# Patient Record
Sex: FEMALE | Race: WHITE | NOT HISPANIC OR LATINO | Employment: UNEMPLOYED | URBAN - NONMETROPOLITAN AREA
[De-identification: names, ages, dates, MRNs, and addresses within clinical notes are randomized per-mention and may not be internally consistent; named-entity substitution may affect disease eponyms.]

---

## 2017-09-08 ENCOUNTER — TRANSCRIBE ORDERS (OUTPATIENT)
Dept: ADMINISTRATIVE | Facility: HOSPITAL | Age: 42
End: 2017-09-08

## 2017-09-08 DIAGNOSIS — R51.9 HEADACHE, UNSPECIFIED HEADACHE TYPE: Primary | ICD-10-CM

## 2017-09-12 ENCOUNTER — APPOINTMENT (OUTPATIENT)
Dept: CT IMAGING | Facility: HOSPITAL | Age: 42
End: 2017-09-12

## 2017-12-29 ENCOUNTER — HOSPITAL ENCOUNTER (EMERGENCY)
Facility: HOSPITAL | Age: 42
End: 2017-12-29

## 2017-12-30 ENCOUNTER — HOSPITAL ENCOUNTER (EMERGENCY)
Facility: HOSPITAL | Age: 42
Discharge: HOME | End: 2017-12-30
Admitting: EMERGENCY MEDICINE

## 2017-12-30 ENCOUNTER — APPOINTMENT (OUTPATIENT)
Dept: CT IMAGING | Facility: HOSPITAL | Age: 42
End: 2017-12-30

## 2017-12-30 VITALS
OXYGEN SATURATION: 99 % | WEIGHT: 180 LBS | HEIGHT: 62 IN | TEMPERATURE: 97 F | BODY MASS INDEX: 33.13 KG/M2 | RESPIRATION RATE: 18 BRPM | HEART RATE: 86 BPM | DIASTOLIC BLOOD PRESSURE: 69 MMHG | SYSTOLIC BLOOD PRESSURE: 110 MMHG
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
## 2017-12-30 DIAGNOSIS — S16.1XXA STRAIN OF NECK MUSCLE, INITIAL ENCOUNTER: ICD-10-CM

## 2017-12-30 DIAGNOSIS — S00.83XA CONTUSION OF FACE, INITIAL ENCOUNTER: Primary | ICD-10-CM

## 2017-12-30 PROCEDURE — 99283 EMERGENCY DEPT VISIT LOW MDM: CPT

## 2017-12-30 PROCEDURE — 72125 CT NECK SPINE W/O DYE: CPT | Performed by: RADIOLOGY

## 2017-12-30 PROCEDURE — 72125 CT NECK SPINE W/O DYE: CPT

## 2017-12-30 PROCEDURE — 70486 CT MAXILLOFACIAL W/O DYE: CPT

## 2017-12-30 PROCEDURE — 70486 CT MAXILLOFACIAL W/O DYE: CPT | Performed by: RADIOLOGY

## 2017-12-30 RX ORDER — CYCLOBENZAPRINE HCL 10 MG
10 TABLET ORAL 3 TIMES DAILY PRN
Qty: 21 TABLET | Refills: 0 | Status: SHIPPED | OUTPATIENT
Start: 2017-12-30 | End: 2020-02-25

## 2017-12-30 RX ORDER — HYDROCODONE BITARTRATE AND ACETAMINOPHEN 5; 325 MG/1; MG/1
1 TABLET ORAL EVERY 6 HOURS PRN
Qty: 10 TABLET | Refills: 0 | Status: SHIPPED | OUTPATIENT
Start: 2017-12-30 | End: 2020-02-25

## 2018-11-27 ENCOUNTER — OFFICE VISIT (OUTPATIENT)
Dept: UROLOGY | Facility: CLINIC | Age: 43
End: 2018-11-27

## 2018-11-27 VITALS — BODY MASS INDEX: 33.68 KG/M2 | WEIGHT: 183 LBS | HEIGHT: 62 IN

## 2018-11-27 DIAGNOSIS — R31.0 GROSS HEMATURIA: ICD-10-CM

## 2018-11-27 DIAGNOSIS — R31.29 MICROHEMATURIA: Primary | ICD-10-CM

## 2018-11-27 LAB
BILIRUB BLD-MCNC: ABNORMAL MG/DL
CLARITY, POC: CLEAR
COLOR UR: YELLOW
GLUCOSE UR STRIP-MCNC: NEGATIVE MG/DL
KETONES UR QL: ABNORMAL
LEUKOCYTE EST, POC: NEGATIVE
NITRITE UR-MCNC: NEGATIVE MG/ML
PH UR: 5 [PH] (ref 5–8)
PROT UR STRIP-MCNC: ABNORMAL MG/DL
RBC # UR STRIP: ABNORMAL /UL
SP GR UR: 1.03 (ref 1–1.03)
UROBILINOGEN UR QL: NORMAL

## 2018-11-27 PROCEDURE — 99214 OFFICE O/P EST MOD 30 MIN: CPT | Performed by: UROLOGY

## 2018-11-27 RX ORDER — DULOXETIN HYDROCHLORIDE 20 MG/1
20 CAPSULE, DELAYED RELEASE ORAL DAILY
COMMUNITY
End: 2020-02-25

## 2018-11-27 NOTE — PROGRESS NOTES
Chief Complaint:          Chief Complaint   Patient presents with   • Blood in Urine       HPI:   43 y.o. female.  43-year-old white female accompanied by her teenage daughter with occasional gross blood for more than 2 years.  She has it when she wipes.  There is a history of stones.  None since she rode a roller coaster several years ago.  There is no history of genitourinary trauma, no urinary tract infections, but she had lots when she was much younger.  She has frequency, urgency, urge incontinence, and stress incontinence the predominance of stress is much greater than the urge.  She is a  4 para 4.  She's had a  and tubal ligation.  Her bowel movements are normal.  Her urinalysis was positive for blood in the range of 3+.  Her examination was entirely unremarkable.  I'm going to initiate an upper and lower tract investigation    Past Medical History:        Past Medical History:   Diagnosis Date   • Kidney stones          Current Meds:     Current Outpatient Medications   Medication Sig Dispense Refill   • DULoxetine (CYMBALTA) 20 MG capsule Take 20 mg by mouth Daily.     • cyclobenzaprine (FLEXERIL) 10 MG tablet Take 1 tablet by mouth 3 (Three) Times a Day As Needed for Muscle Spasms. 21 tablet 0   • HYDROcodone-acetaminophen (NORCO) 5-325 MG per tablet Take 1 tablet by mouth Every 6 (Six) Hours As Needed for Moderate Pain . 10 tablet 0     No current facility-administered medications for this visit.         Allergies:      Allergies   Allergen Reactions   • Naproxen    • Penicillins         Past Surgical History:     Past Surgical History:   Procedure Laterality Date   •  SECTION     • TUBAL ABDOMINAL LIGATION           Social History:     Social History     Socioeconomic History   • Marital status:      Spouse name: Not on file   • Number of children: Not on file   • Years of education: Not on file   • Highest education level: Not on file   Social Needs   • Financial resource  strain: Not on file   • Food insecurity - worry: Not on file   • Food insecurity - inability: Not on file   • Transportation needs - medical: Not on file   • Transportation needs - non-medical: Not on file   Occupational History   • Not on file   Tobacco Use   • Smoking status: Never Smoker   • Smokeless tobacco: Never Used   Substance and Sexual Activity   • Alcohol use: No   • Drug use: No   • Sexual activity: Not on file   Other Topics Concern   • Not on file   Social History Narrative   • Not on file       Family History:     Family History   Problem Relation Age of Onset   • Breast cancer Paternal Aunt    • Hypertension Mother        Review of Systems:     Review of Systems   Constitutional: Negative.  Negative for activity change, appetite change, chills, diaphoresis, fatigue and unexpected weight change.   HENT: Negative for congestion, dental problem, drooling, ear discharge, ear pain, facial swelling, hearing loss, mouth sores, nosebleeds, postnasal drip, rhinorrhea, sinus pressure, sneezing, sore throat, tinnitus, trouble swallowing and voice change.    Eyes: Negative.  Negative for photophobia, pain, discharge, redness, itching and visual disturbance.   Respiratory: Negative.  Negative for apnea, cough, choking, chest tightness, shortness of breath, wheezing and stridor.    Cardiovascular: Negative.  Negative for chest pain, palpitations and leg swelling.   Gastrointestinal: Negative.  Negative for abdominal distention, abdominal pain, anal bleeding, blood in stool, constipation, diarrhea, nausea, rectal pain and vomiting.   Endocrine: Negative.  Negative for cold intolerance, heat intolerance, polydipsia, polyphagia and polyuria.   Genitourinary: Positive for hematuria.   Musculoskeletal: Negative.  Negative for arthralgias, back pain, gait problem, joint swelling, myalgias, neck pain and neck stiffness.   Skin: Negative.  Negative for color change, pallor, rash and wound.   Allergic/Immunologic:  Negative.  Negative for environmental allergies, food allergies and immunocompromised state.   Neurological: Negative.  Negative for dizziness, tremors, seizures, syncope, facial asymmetry, speech difficulty, weakness, light-headedness, numbness and headaches.   Hematological: Negative.  Negative for adenopathy. Does not bruise/bleed easily.   Psychiatric/Behavioral: Negative for agitation, behavioral problems, confusion, decreased concentration, dysphoric mood, hallucinations, self-injury, sleep disturbance and suicidal ideas. The patient is not nervous/anxious and is not hyperactive.    All other systems reviewed and are negative.      Physical Exam:     Physical Exam   Constitutional: She appears well-developed and well-nourished.   HENT:   Head: Normocephalic and atraumatic.   Right Ear: External ear normal.   Left Ear: External ear normal.   Mouth/Throat: Oropharynx is clear and moist.   Eyes: Conjunctivae are normal. Pupils are equal, round, and reactive to light.   Cardiovascular: Normal rate, regular rhythm, normal heart sounds and intact distal pulses.   Pulmonary/Chest: Effort normal and breath sounds normal.   Abdominal: Soft. Bowel sounds are normal. She exhibits no distension and no mass. There is no tenderness. There is no rebound and no guarding.   Genitourinary: No vaginal discharge found.   Genitourinary Comments: Soft nontender abdomen with no organomegaly, rigidity, guarding or tenderness.  Normal vaginal orifice.  No evidence of prolapse no palpable masses.  No significant perineal body abnormalities and a normal external anus.  Neurologic exam is nonfocal.   Musculoskeletal: Normal range of motion.   Neurological: She is alert. She has normal reflexes.   Skin: Skin is warm and dry.   Psychiatric: She has a normal mood and affect. Her behavior is normal. Judgment and thought content normal.       I have reviewed the following portions of the patient's history: allergies, current medications, past  family history, past medical history, past social history, past surgical history, problem list and ROS and confirm it's accurate.      Procedure:       Assessment/Plan:   Hematuria-patient was diagnosed with hematuria.  We discussed the significance of microscopic hematuria versus gross hematuria.  We discussed the presence or absence of the type of clotting identified including vermiform clots consistent with ureteral bleeding versus just pink tinged urine versus desiree clots.  We discussed the presence of urokinase in the urine which causes the clots to dissolve with time.  We discussed the fact that it takes only a very small amount of blood in the urine to make the urine very red appearing and therefore give one the impression that there is much more blood loss that is really present.  He discussed the use of both an upper and lower tract investigation.  I discussed the fact that an upper tract investigation includes a normal renal ultrasound with a significant risks of missing more subtle lesions.  Progressing to a CT scan without contrast and finally the CT scan with contrast being the gold standard to diagnose the small neoplasms.  We discussed the lower tract investigation consisting of a cystoscopy in many of the cases where the upper tract study is negative.  Also discussed the fact that if there is a contraindication to the use of contrast we would do a noncontrasted study and this also has a chance of missing small lesions.  The specific instance would be cases of diabetes and chronic renal insufficiency.  Discussed the fact that there is about a 96% chance of a negative workup with episodes of microscopic hematuria and with much greater in the face of gross hematuria.  We discussed the fact that this is a non-cumulative test.  In other words if there is hematuria next year I would recommend continuing to work up the condition because of the fact that neoplasms may be small at the first workup and easily  are missed.  I discussed the differential diagnosis of hematuria including trauma, neoplasia, infection, etc.  We discussed the fact that if there is any history of chronic kidney disease or risk factors such as diabetes for contrast a noncontrasted study will be utilized.  We will initiate an investigation.     Patient's Body mass index is 33.47 kg/m². BMI is above normal parameters. Recommendations include: educational material.          This document has been electronically signed by JUAN CARLOS RIDER MD November 27, 2018 3:13 PM

## 2018-11-28 PROBLEM — R31.0 GROSS HEMATURIA: Status: ACTIVE | Noted: 2018-11-28

## 2018-12-10 ENCOUNTER — APPOINTMENT (OUTPATIENT)
Dept: CT IMAGING | Facility: HOSPITAL | Age: 43
End: 2018-12-10
Attending: UROLOGY

## 2018-12-13 ENCOUNTER — HOSPITAL ENCOUNTER (OUTPATIENT)
Dept: CT IMAGING | Facility: HOSPITAL | Age: 43
Discharge: HOME | End: 2018-12-13
Attending: UROLOGY | Admitting: UROLOGY

## 2018-12-13 DIAGNOSIS — R31.29 MICROHEMATURIA: ICD-10-CM

## 2018-12-13 PROCEDURE — 25010000002 IOPAMIDOL 61 % SOLUTION: Performed by: UROLOGY

## 2018-12-13 PROCEDURE — 74178 CT ABD&PLV WO CNTR FLWD CNTR: CPT | Performed by: RADIOLOGY

## 2018-12-13 PROCEDURE — 74178 CT ABD&PLV WO CNTR FLWD CNTR: CPT

## 2018-12-13 RX ADMIN — IOPAMIDOL 100 ML: 612 INJECTION, SOLUTION INTRAVENOUS at 14:01

## 2018-12-17 ENCOUNTER — PROCEDURE VISIT (OUTPATIENT)
Dept: UROLOGY | Facility: CLINIC | Age: 43
End: 2018-12-17

## 2018-12-17 VITALS — BODY MASS INDEX: 33.68 KG/M2 | HEIGHT: 62 IN | WEIGHT: 183 LBS

## 2018-12-17 DIAGNOSIS — N34.2 INFECTIVE URETHRITIS: ICD-10-CM

## 2018-12-17 DIAGNOSIS — Z48.816 AFTERCARE FOLLOWING SURGERY OF THE GENITOURINARY SYSTEM: Primary | ICD-10-CM

## 2018-12-17 PROCEDURE — 52000 CYSTOURETHROSCOPY: CPT | Performed by: UROLOGY

## 2018-12-17 PROCEDURE — 96372 THER/PROPH/DIAG INJ SC/IM: CPT | Performed by: UROLOGY

## 2018-12-17 PROCEDURE — 99213 OFFICE O/P EST LOW 20 MIN: CPT | Performed by: UROLOGY

## 2018-12-17 RX ORDER — NITROFURANTOIN 25; 75 MG/1; MG/1
100 CAPSULE ORAL DAILY
Qty: 60 CAPSULE | Refills: 3 | Status: SHIPPED | OUTPATIENT
Start: 2018-12-17 | End: 2020-02-25

## 2018-12-17 RX ORDER — GENTAMICIN SULFATE 40 MG/ML
80 INJECTION, SOLUTION INTRAMUSCULAR; INTRAVENOUS ONCE
Status: COMPLETED | OUTPATIENT
Start: 2018-12-17 | End: 2018-12-17

## 2018-12-17 RX ADMIN — GENTAMICIN SULFATE 80 MG: 40 INJECTION, SOLUTION INTRAMUSCULAR; INTRAVENOUS at 08:22

## 2018-12-17 NOTE — PROGRESS NOTES
Chief Complaint:          Chief Complaint   Patient presents with   • Blood in Urine     Microscopic Hematuria       HPI:   43 y.o. female.  43-year-old white female accompanied by her teenage daughter with occasional gross blood for more than 2 years.  She has it when she wipes.  There is a history of stones.  None since she rode a roller coaster several years ago.  There is no history of genitourinary trauma, no urinary tract infections, but she had lots when she was much younger.  She has frequency, urgency, urge incontinence, and stress incontinence the predominance of stress is much greater than the urge.  She is a  4 para 4.  She's had a  and tubal ligation.  Her bowel movements are normal.  Her urinalysis was positive for blood in the range of 3+.  Her examination was entirely unremarkable.  I'm going to initiate an upper and lower tract investigation.  She returns today.  Her CT with and without contrast was negative.  Her cystoscopy showed extensive inflammatory polyps in a place her on prophylactic Macrodantin and see her back in 8 weeks    Past Medical History:        Past Medical History:   Diagnosis Date   • Kidney stones          Current Meds:     Current Outpatient Medications   Medication Sig Dispense Refill   • cyclobenzaprine (FLEXERIL) 10 MG tablet Take 1 tablet by mouth 3 (Three) Times a Day As Needed for Muscle Spasms. 21 tablet 0   • DULoxetine (CYMBALTA) 20 MG capsule Take 20 mg by mouth Daily.     • HYDROcodone-acetaminophen (NORCO) 5-325 MG per tablet Take 1 tablet by mouth Every 6 (Six) Hours As Needed for Moderate Pain . 10 tablet 0     No current facility-administered medications for this visit.         Allergies:      Allergies   Allergen Reactions   • Naproxen    • Penicillins         Past Surgical History:     Past Surgical History:   Procedure Laterality Date   •  SECTION     • TUBAL ABDOMINAL LIGATION           Social History:     Social History      Socioeconomic History   • Marital status:      Spouse name: Not on file   • Number of children: Not on file   • Years of education: Not on file   • Highest education level: Not on file   Social Needs   • Financial resource strain: Not on file   • Food insecurity - worry: Not on file   • Food insecurity - inability: Not on file   • Transportation needs - medical: Not on file   • Transportation needs - non-medical: Not on file   Occupational History   • Not on file   Tobacco Use   • Smoking status: Never Smoker   • Smokeless tobacco: Never Used   Substance and Sexual Activity   • Alcohol use: No   • Drug use: No   • Sexual activity: Not on file   Other Topics Concern   • Not on file   Social History Narrative   • Not on file       Family History:     Family History   Problem Relation Age of Onset   • Breast cancer Paternal Aunt    • Hypertension Mother        Review of Systems:     Review of Systems   Constitutional: Negative.  Negative for activity change, appetite change, chills, diaphoresis, fatigue and unexpected weight change.   HENT: Negative for congestion, dental problem, drooling, ear discharge, ear pain, facial swelling, hearing loss, mouth sores, nosebleeds, postnasal drip, rhinorrhea, sinus pressure, sneezing, sore throat, tinnitus, trouble swallowing and voice change.    Eyes: Negative.  Negative for photophobia, pain, discharge, redness, itching and visual disturbance.   Respiratory: Negative.  Negative for apnea, cough, choking, chest tightness, shortness of breath, wheezing and stridor.    Cardiovascular: Negative.  Negative for chest pain, palpitations and leg swelling.   Gastrointestinal: Negative.  Negative for abdominal distention, abdominal pain, anal bleeding, blood in stool, constipation, diarrhea, nausea, rectal pain and vomiting.   Endocrine: Negative.  Negative for cold intolerance, heat intolerance, polydipsia, polyphagia and polyuria.   Musculoskeletal: Negative.  Negative for  arthralgias, back pain, gait problem, joint swelling, myalgias, neck pain and neck stiffness.   Skin: Negative.  Negative for color change, pallor, rash and wound.   Allergic/Immunologic: Negative.  Negative for environmental allergies, food allergies and immunocompromised state.   Neurological: Negative.  Negative for dizziness, tremors, seizures, syncope, facial asymmetry, speech difficulty, weakness, light-headedness, numbness and headaches.   Hematological: Negative.  Negative for adenopathy. Does not bruise/bleed easily.   Psychiatric/Behavioral: Negative for agitation, behavioral problems, confusion, decreased concentration, dysphoric mood, hallucinations, self-injury, sleep disturbance and suicidal ideas. The patient is not nervous/anxious and is not hyperactive.    All other systems reviewed and are negative.      Physical Exam:     Physical Exam   Constitutional: She appears well-developed and well-nourished.   HENT:   Head: Normocephalic and atraumatic.   Right Ear: External ear normal.   Left Ear: External ear normal.   Mouth/Throat: Oropharynx is clear and moist.   Eyes: Conjunctivae are normal. Pupils are equal, round, and reactive to light.   Cardiovascular: Normal rate, regular rhythm, normal heart sounds and intact distal pulses.   Pulmonary/Chest: Effort normal and breath sounds normal.   Abdominal: Soft. Bowel sounds are normal. She exhibits no distension and no mass. There is no tenderness. There is no rebound and no guarding.   Genitourinary: Vagina normal. No vaginal discharge found.   Musculoskeletal: Normal range of motion.   Neurological: She is alert. She has normal reflexes.   Skin: Skin is warm and dry.   Psychiatric: She has a normal mood and affect. Her behavior is normal. Judgment and thought content normal.       I have reviewed the following portions of the patient's history: allergies, current medications, past family history, past medical history, past social history, past surgical  history, problem list and ROS and confirm it's accurate.      Procedure:   Cystoscopy:  Patient presents today for cystourethroscopy.  I went ahead and obtained an informed consent including the risk of anesthesia, bleeding, infection, etc.  After prep and drape in a sterile fashion in the low dorsal lithotomy position the urethra was gently anesthetized with 10 cc of 2% viscous Xylocaine jelly.  After an appropriate period of topical anesthesia I used the Olympus digital 14 [place] flexible cystoscope to examine the anterior urethra which was completely normal, the ureteral orifices were visualized and normal in position and configuration there were no stones, tumors or foreign bodies.  Were extensive inflammatory polyps at the bladder neck The blue light was enabled and was negative allowing us to see small mucosal lesions. The patient was given 80 mg of gentamicin in an intramuscular fashion  as prophylaxis for the cystoscopy and released from the clinic.    Assessment/Plan:   Chronic cystitis-as evidenced by inflammatory polyps at the bladder neck I recommended prophylactic Macrodantin and a recheck in 8 weeks     Patient's Body mass index is 33.47 kg/m². BMI is above normal parameters. Recommendations include: educational material.          This document has been electronically signed by JUAN CARLOS RIDER MD December 17, 2018 8:04 AM

## 2018-12-20 PROBLEM — N34.2 INFECTIVE URETHRITIS: Status: ACTIVE | Noted: 2018-12-20

## 2019-02-11 ENCOUNTER — OFFICE VISIT (OUTPATIENT)
Dept: UROLOGY | Facility: CLINIC | Age: 44
End: 2019-02-11

## 2019-02-11 VITALS — HEIGHT: 62 IN | WEIGHT: 183 LBS | BODY MASS INDEX: 33.68 KG/M2

## 2019-02-11 DIAGNOSIS — R31.9 HEMATURIA, UNSPECIFIED TYPE: Primary | ICD-10-CM

## 2019-02-11 LAB
BILIRUB BLD-MCNC: NEGATIVE MG/DL
CLARITY, POC: CLEAR
COLOR UR: YELLOW
GLUCOSE UR STRIP-MCNC: NEGATIVE MG/DL
KETONES UR QL: NEGATIVE
LEUKOCYTE EST, POC: NEGATIVE
NITRITE UR-MCNC: NEGATIVE MG/ML
PH UR: 5.5 [PH] (ref 5–8)
PROT UR STRIP-MCNC: ABNORMAL MG/DL
RBC # UR STRIP: ABNORMAL /UL
SP GR UR: 1.03 (ref 1–1.03)
UROBILINOGEN UR QL: NORMAL

## 2019-02-11 PROCEDURE — 99213 OFFICE O/P EST LOW 20 MIN: CPT | Performed by: UROLOGY

## 2019-02-11 NOTE — PROGRESS NOTES
Chief Complaint:          Chief Complaint   Patient presents with   • F/u Hematuria       HPI:   43 y.o. female.  43-year-old white female accompanied by her teenage daughter with occasional gross blood for more than 2 years.  She has it when she wipes.  There is a history of stones.  None since she rode a roller coaster several years ago.  There is no history of genitourinary trauma, no urinary tract infections, but she had lots when she was much younger.  She has frequency, urgency, urge incontinence, and stress incontinence the predominance of stress is much greater than the urge.  She is a  4 para 4.  She's had a  and tubal ligation.  Her bowel movements are normal.  Her urinalysis was positive for blood in the range of 3+.  Her examination was entirely unremarkable.  I'm going to initiate an upper and lower tract investigation.  She returns today.  Her CT with and without contrast was negative.  Her cystoscopy showed extensive inflammatory polyps in a place her on prophylactic Macrodantin and see her back in 8 weeks  She returns today she's doing great she is completely asymptomatic I'll see her back in 1 year        Past Medical History:        Past Medical History:   Diagnosis Date   • Kidney stones          Current Meds:     Current Outpatient Medications   Medication Sig Dispense Refill   • cyclobenzaprine (FLEXERIL) 10 MG tablet Take 1 tablet by mouth 3 (Three) Times a Day As Needed for Muscle Spasms. 21 tablet 0   • DULoxetine (CYMBALTA) 20 MG capsule Take 20 mg by mouth Daily.     • HYDROcodone-acetaminophen (NORCO) 5-325 MG per tablet Take 1 tablet by mouth Every 6 (Six) Hours As Needed for Moderate Pain . 10 tablet 0   • nitrofurantoin, macrocrystal-monohydrate, (MACROBID) 100 MG capsule Take 1 capsule by mouth Daily. 60 capsule 3     No current facility-administered medications for this visit.         Allergies:      Allergies   Allergen Reactions   • Naproxen    • Penicillins          Past Surgical History:     Past Surgical History:   Procedure Laterality Date   •  SECTION     • TUBAL ABDOMINAL LIGATION           Social History:     Social History     Socioeconomic History   • Marital status:      Spouse name: Not on file   • Number of children: Not on file   • Years of education: Not on file   • Highest education level: Not on file   Social Needs   • Financial resource strain: Not on file   • Food insecurity - worry: Not on file   • Food insecurity - inability: Not on file   • Transportation needs - medical: Not on file   • Transportation needs - non-medical: Not on file   Occupational History   • Not on file   Tobacco Use   • Smoking status: Never Smoker   • Smokeless tobacco: Never Used   Substance and Sexual Activity   • Alcohol use: No   • Drug use: No   • Sexual activity: Yes     Partners: Male     Birth control/protection: None   Other Topics Concern   • Not on file   Social History Narrative   • Not on file       Family History:     Family History   Problem Relation Age of Onset   • Breast cancer Paternal Aunt    • Hypertension Mother        Review of Systems:     Review of Systems   Constitutional: Negative.  Negative for activity change, appetite change, chills, diaphoresis, fatigue and unexpected weight change.   HENT: Negative for congestion, dental problem, drooling, ear discharge, ear pain, facial swelling, hearing loss, mouth sores, nosebleeds, postnasal drip, rhinorrhea, sinus pressure, sneezing, sore throat, tinnitus, trouble swallowing and voice change.    Eyes: Negative.  Negative for photophobia, pain, discharge, redness, itching and visual disturbance.   Respiratory: Negative.  Negative for apnea, cough, choking, chest tightness, shortness of breath, wheezing and stridor.    Cardiovascular: Negative.  Negative for chest pain, palpitations and leg swelling.   Gastrointestinal: Negative.  Negative for abdominal distention, abdominal pain, anal bleeding, blood  in stool, constipation, diarrhea, nausea, rectal pain and vomiting.   Endocrine: Negative.  Negative for cold intolerance, heat intolerance, polydipsia, polyphagia and polyuria.   Musculoskeletal: Negative.  Negative for arthralgias, back pain, gait problem, joint swelling, myalgias, neck pain and neck stiffness.   Skin: Negative.  Negative for color change, pallor, rash and wound.   Allergic/Immunologic: Negative.  Negative for environmental allergies, food allergies and immunocompromised state.   Neurological: Negative.  Negative for dizziness, tremors, seizures, syncope, facial asymmetry, speech difficulty, weakness, light-headedness, numbness and headaches.   Hematological: Negative.  Negative for adenopathy. Does not bruise/bleed easily.   Psychiatric/Behavioral: Negative for agitation, behavioral problems, confusion, decreased concentration, dysphoric mood, hallucinations, self-injury, sleep disturbance and suicidal ideas. The patient is not nervous/anxious and is not hyperactive.    All other systems reviewed and are negative.      Physical Exam:     Physical Exam   Constitutional: She appears well-developed and well-nourished.   HENT:   Head: Normocephalic and atraumatic.   Right Ear: External ear normal.   Left Ear: External ear normal.   Mouth/Throat: Oropharynx is clear and moist.   Eyes: Conjunctivae are normal. Pupils are equal, round, and reactive to light.   Cardiovascular: Normal rate, regular rhythm, normal heart sounds and intact distal pulses.   Pulmonary/Chest: Effort normal and breath sounds normal.   Abdominal: Soft. Bowel sounds are normal. She exhibits no distension and no mass. There is no tenderness. There is no rebound and no guarding.   Genitourinary: No vaginal discharge found.   Musculoskeletal: Normal range of motion.   Neurological: She is alert. She has normal reflexes.   Skin: Skin is warm and dry.   Psychiatric: She has a normal mood and affect. Her behavior is normal. Judgment and  thought content normal.       I have reviewed the following portions of the patient's history: allergies, current medications, past family history, past medical history, past social history, past surgical history, problem list and ROS and confirm it's accurate.      Procedure:       Assessment/Plan:   Hematuria-patient was diagnosed with hematuria.  We discussed the significance of microscopic hematuria versus gross hematuria.  We discussed the presence or absence of the type of clotting identified including vermiform clots consistent with ureteral bleeding versus just pink tinged urine versus desiree clots.  We discussed the presence of urokinase in the urine which causes the clots to dissolve with time.  We discussed the fact that it takes only a very small amount of blood in the urine to make the urine very red appearing and therefore give one the impression that there is much more blood loss that is really present.  He discussed the use of both an upper and lower tract investigation.  I discussed the fact that an upper tract investigation includes a normal renal ultrasound with a significant risks of missing more subtle lesions.  Progressing to a CT scan without contrast and finally the CT scan with contrast being the gold standard to diagnose the small neoplasms.  We discussed the lower tract investigation consisting of a cystoscopy in many of the cases where the upper tract study is negative.  Also discussed the fact that if there is a contraindication to the use of contrast we would do a noncontrasted study and this also has a chance of missing small lesions.  The specific instance would be cases of diabetes and chronic renal insufficiency.  Discussed the fact that there is about a 96% chance of a negative workup with episodes of microscopic hematuria and with much greater in the face of gross hematuria.  We discussed the fact that this is a non-cumulative test.  In other words if there is hematuria next year I  would recommend continuing to work up the condition because of the fact that neoplasms may be small at the first workup and easily are missed.  I discussed the differential diagnosis of hematuria including trauma, neoplasia, infection, etc.  We discussed the fact that if there is any history of chronic kidney disease or risk factors such as diabetes for contrast a noncontrasted study will be utilized.  We will initiate an investigation.  Is had a completely negative workup of her symptoms are now completely resolved     Patient's Body mass index is 33.47 kg/m². BMI is above normal parameters. Recommendations include: educational material.          This document has been electronically signed by JUAN CARLOS RIDER MD February 11, 2019 9:16 AM

## 2020-02-25 ENCOUNTER — OFFICE VISIT (OUTPATIENT)
Dept: UROLOGY | Facility: CLINIC | Age: 45
End: 2020-02-25

## 2020-02-25 VITALS — HEIGHT: 63 IN | BODY MASS INDEX: 35.26 KG/M2 | WEIGHT: 199 LBS

## 2020-02-25 DIAGNOSIS — R31.0 GROSS HEMATURIA: ICD-10-CM

## 2020-02-25 DIAGNOSIS — R31.9 HEMATURIA, UNSPECIFIED TYPE: Primary | ICD-10-CM

## 2020-02-25 LAB
BILIRUB BLD-MCNC: NEGATIVE MG/DL
CLARITY, POC: CLEAR
COLOR UR: YELLOW
GLUCOSE UR STRIP-MCNC: NEGATIVE MG/DL
KETONES UR QL: NEGATIVE
LEUKOCYTE EST, POC: NEGATIVE
NITRITE UR-MCNC: NEGATIVE MG/ML
PH UR: 5.5 [PH] (ref 5–8)
PROT UR STRIP-MCNC: NEGATIVE MG/DL
RBC # UR STRIP: ABNORMAL /UL
SP GR UR: 1.02 (ref 1–1.03)
UROBILINOGEN UR QL: NORMAL

## 2020-02-25 PROCEDURE — 99213 OFFICE O/P EST LOW 20 MIN: CPT | Performed by: UROLOGY

## 2020-02-25 RX ORDER — ALBUTEROL SULFATE 90 UG/1
AEROSOL, METERED RESPIRATORY (INHALATION)
COMMUNITY
Start: 2020-01-13 | End: 2020-08-25

## 2020-02-25 RX ORDER — IBUPROFEN 800 MG/1
TABLET ORAL
COMMUNITY
Start: 2020-01-13 | End: 2020-08-25

## 2020-02-25 NOTE — PROGRESS NOTES
Chief Complaint:          Chief Complaint   Patient presents with   • Blood in Urine     1 yr f/u       HPI:   44 y.o. female with hematuria.  She is had an extensive previous work-up.  She has a history of stones.  Her CT with and without contrast was negative her cystoscopy showed inflammatory polyps she returns today she is absolutely asymptomatic her urine was positive we discussed the chronicity of hematuria I am going to recommend close observation and follow-up based on this.      Past Medical History:        Past Medical History:   Diagnosis Date   • Kidney stones          Current Meds:     Current Outpatient Medications   Medication Sig Dispense Refill   • albuterol sulfate  (90 Base) MCG/ACT inhaler      • ibuprofen (ADVIL,MOTRIN) 800 MG tablet        No current facility-administered medications for this visit.         Allergies:      Allergies   Allergen Reactions   • Naproxen Rash   • Penicillins Rash        Past Surgical History:     Past Surgical History:   Procedure Laterality Date   •  SECTION     • TUBAL ABDOMINAL LIGATION           Social History:     Social History     Socioeconomic History   • Marital status:      Spouse name: Not on file   • Number of children: Not on file   • Years of education: Not on file   • Highest education level: Not on file   Tobacco Use   • Smoking status: Never Smoker   • Smokeless tobacco: Never Used   Substance and Sexual Activity   • Alcohol use: No   • Drug use: No   • Sexual activity: Yes     Partners: Male     Birth control/protection: None       Family History:     Family History   Problem Relation Age of Onset   • Breast cancer Paternal Aunt    • Hypertension Mother        Review of Systems:     Review of Systems   Constitutional: Negative.  Negative for activity change, appetite change, chills, diaphoresis, fatigue and unexpected weight change.   HENT: Negative for congestion, dental problem, drooling, ear discharge, ear pain, facial  swelling, hearing loss, mouth sores, nosebleeds, postnasal drip, rhinorrhea, sinus pressure, sneezing, sore throat, tinnitus, trouble swallowing and voice change.    Eyes: Negative.  Negative for photophobia, pain, discharge, redness, itching and visual disturbance.   Respiratory: Negative.  Negative for apnea, cough, choking, chest tightness, shortness of breath, wheezing and stridor.    Cardiovascular: Negative.  Negative for chest pain, palpitations and leg swelling.   Gastrointestinal: Negative.  Negative for abdominal distention, abdominal pain, anal bleeding, blood in stool, constipation, diarrhea, nausea, rectal pain and vomiting.   Endocrine: Negative.  Negative for cold intolerance, heat intolerance, polydipsia, polyphagia and polyuria.   Genitourinary: Positive for hematuria.   Musculoskeletal: Negative.  Negative for arthralgias, back pain, gait problem, joint swelling, myalgias, neck pain and neck stiffness.   Skin: Negative.  Negative for color change, pallor, rash and wound.   Allergic/Immunologic: Negative.  Negative for environmental allergies, food allergies and immunocompromised state.   Neurological: Negative.  Negative for dizziness, tremors, seizures, syncope, facial asymmetry, speech difficulty, weakness, light-headedness, numbness and headaches.   Hematological: Negative.  Negative for adenopathy. Does not bruise/bleed easily.   Psychiatric/Behavioral: Negative for agitation, behavioral problems, confusion, decreased concentration, dysphoric mood, hallucinations, self-injury, sleep disturbance and suicidal ideas. The patient is not nervous/anxious and is not hyperactive.    All other systems reviewed and are negative.      Physical Exam:     Physical Exam   Constitutional: She appears well-developed and well-nourished.   HENT:   Head: Normocephalic and atraumatic.   Right Ear: External ear normal.   Left Ear: External ear normal.   Mouth/Throat: Oropharynx is clear and moist.   Eyes: Pupils  are equal, round, and reactive to light. Conjunctivae are normal.   Cardiovascular: Normal rate, regular rhythm, normal heart sounds and intact distal pulses.   Pulmonary/Chest: Effort normal and breath sounds normal.   Abdominal: Soft. Bowel sounds are normal. She exhibits no distension and no mass. There is no tenderness. There is no rebound and no guarding.   Genitourinary: No vaginal discharge found.   Musculoskeletal: Normal range of motion.   Neurological: She is alert. She has normal reflexes.   Skin: Skin is warm and dry.   Psychiatric: She has a normal mood and affect. Her behavior is normal. Judgment and thought content normal.       I have reviewed the following portions of the patient's history: allergies, current medications, past family history, past medical history, past social history, past surgical history, problem list and ROS and confirm it's accurate.    Procedure:       Assessment/Plan:   Hematuria-patient was diagnosed with hematuria.  We discussed the significance of microscopic hematuria versus gross hematuria.  We discussed the presence or absence of the type of clotting identified including vermiform clots consistent with ureteral bleeding versus just pink tinged urine versus desiree clots.  We discussed the presence of urokinase in the urine which causes the clots to dissolve with time.  We discussed the fact that it takes only a very small amount of blood in the urine to make the urine very red appearing and therefore give one the impression that there is much more blood loss that is really present.  He discussed the use of both an upper and lower tract investigation.  I discussed the fact that an upper tract investigation includes a normal renal ultrasound with a significant risks of missing more subtle lesions.  Progressing to a CT scan without contrast and finally the CT scan with contrast being the gold standard to diagnose the small neoplasms.  We discussed the lower tract  investigation consisting of a cystoscopy in many of the cases where the upper tract study is negative.  Also discussed the fact that if there is a contraindication to the use of contrast we would do a noncontrasted study and this also has a chance of missing small lesions.  The specific instance would be cases of diabetes and chronic renal insufficiency.  Discussed the fact that there is about a 96% chance of a negative workup with episodes of microscopic hematuria and with much greater in the face of gross hematuria.  We discussed the fact that this is a non-cumulative test.  In other words if there is hematuria next year I would recommend continuing to work up the condition because of the fact that neoplasms may be small at the first workup and easily are missed.  I discussed the differential diagnosis of hematuria including trauma, neoplasia, infection, etc.  We discussed the fact that if there is any history of chronic kidney disease or risk factors such as diabetes for contrast a noncontrasted study will be utilized.  We will initiate an investigation.  Status post an upper and lower tract investigation we will watch her yearly            Patient's Body mass index is 35.25 kg/m². BMI is above normal parameters. Recommendations include: educational material.              This document has been electronically signed by JUAN CARLOS RIDER MD February 25, 2020 3:15 PM

## 2020-06-26 ENCOUNTER — OFFICE VISIT (OUTPATIENT)
Dept: UROLOGY | Facility: CLINIC | Age: 45
End: 2020-06-26

## 2020-06-26 ENCOUNTER — HOSPITAL ENCOUNTER (OUTPATIENT)
Dept: GENERAL RADIOLOGY | Facility: HOSPITAL | Age: 45
Discharge: HOME | End: 2020-06-26
Admitting: UROLOGY

## 2020-06-26 VITALS — HEIGHT: 63 IN | TEMPERATURE: 96.5 F | WEIGHT: 199.08 LBS | BODY MASS INDEX: 35.27 KG/M2

## 2020-06-26 DIAGNOSIS — N20.0 RENAL CALCULUS: ICD-10-CM

## 2020-06-26 DIAGNOSIS — R31.9 HEMATURIA, UNSPECIFIED TYPE: Primary | ICD-10-CM

## 2020-06-26 DIAGNOSIS — R31.9 HEMATURIA, UNSPECIFIED TYPE: ICD-10-CM

## 2020-06-26 PROCEDURE — 99213 OFFICE O/P EST LOW 20 MIN: CPT | Performed by: UROLOGY

## 2020-06-26 PROCEDURE — 74018 RADEX ABDOMEN 1 VIEW: CPT | Performed by: RADIOLOGY

## 2020-06-26 PROCEDURE — 74018 RADEX ABDOMEN 1 VIEW: CPT

## 2020-06-26 NOTE — PROGRESS NOTES
Chief Complaint:          Chief Complaint   Patient presents with   • Flank Pain     kub prior       HPI:   44 y.o. female returns today status post a negative cystoscopy.  She has a possible kidney stone.  KUB was plus or minus she had a large amount of stool no gross hematuria, need for 2 months if it persists some negative CT scan      Past Medical History:        Past Medical History:   Diagnosis Date   • Kidney stones          Current Meds:     Current Outpatient Medications   Medication Sig Dispense Refill   • albuterol sulfate  (90 Base) MCG/ACT inhaler      • ibuprofen (ADVIL,MOTRIN) 800 MG tablet        No current facility-administered medications for this visit.         Allergies:      Allergies   Allergen Reactions   • Naproxen Rash   • Penicillins Rash        Past Surgical History:     Past Surgical History:   Procedure Laterality Date   •  SECTION     • TUBAL ABDOMINAL LIGATION           Social History:     Social History     Socioeconomic History   • Marital status:      Spouse name: Not on file   • Number of children: Not on file   • Years of education: Not on file   • Highest education level: Not on file   Tobacco Use   • Smoking status: Never Smoker   • Smokeless tobacco: Never Used   Substance and Sexual Activity   • Alcohol use: No   • Drug use: No   • Sexual activity: Yes     Partners: Male     Birth control/protection: None       Family History:     Family History   Problem Relation Age of Onset   • Breast cancer Paternal Aunt    • Hypertension Mother        Review of Systems:     Review of Systems   Constitutional: Negative.  Negative for activity change, appetite change, chills, diaphoresis, fatigue and unexpected weight change.   HENT: Negative for congestion, dental problem, drooling, ear discharge, ear pain, facial swelling, hearing loss, mouth sores, nosebleeds, postnasal drip, rhinorrhea, sinus pressure, sneezing, sore throat, tinnitus, trouble swallowing and voice  change.    Eyes: Negative.  Negative for photophobia, pain, discharge, redness, itching and visual disturbance.   Respiratory: Negative.  Negative for apnea, cough, choking, chest tightness, shortness of breath, wheezing and stridor.    Cardiovascular: Negative.  Negative for chest pain, palpitations and leg swelling.   Gastrointestinal: Negative.  Negative for abdominal distention, abdominal pain, anal bleeding, blood in stool, constipation, diarrhea, nausea, rectal pain and vomiting.   Endocrine: Negative.  Negative for cold intolerance, heat intolerance, polydipsia, polyphagia and polyuria.   Genitourinary: Positive for flank pain.   Musculoskeletal: Negative for arthralgias, back pain, gait problem, joint swelling, myalgias, neck pain and neck stiffness.   Skin: Negative.  Negative for color change, pallor, rash and wound.   Allergic/Immunologic: Negative.  Negative for environmental allergies, food allergies and immunocompromised state.   Neurological: Negative.  Negative for dizziness, tremors, seizures, syncope, facial asymmetry, speech difficulty, weakness, light-headedness, numbness and headaches.   Hematological: Negative.  Negative for adenopathy. Does not bruise/bleed easily.   Psychiatric/Behavioral: Negative for agitation, behavioral problems, confusion, decreased concentration, dysphoric mood, hallucinations, self-injury, sleep disturbance and suicidal ideas. The patient is not nervous/anxious and is not hyperactive.    All other systems reviewed and are negative.      Physical Exam:     Physical Exam   Constitutional: She appears well-developed and well-nourished.   HENT:   Head: Normocephalic and atraumatic.   Right Ear: External ear normal.   Left Ear: External ear normal.   Mouth/Throat: Oropharynx is clear and moist.   Eyes: Pupils are equal, round, and reactive to light. Conjunctivae are normal.   Cardiovascular: Normal rate, regular rhythm, normal heart sounds and intact distal pulses.      Pulmonary/Chest: Effort normal and breath sounds normal.   Abdominal: Soft. Bowel sounds are normal. She exhibits no distension and no mass. There is no tenderness. There is no rebound and no guarding.   Genitourinary: No vaginal discharge found.   Musculoskeletal: Normal range of motion.   Neurological: She is alert. She has normal reflexes.   Skin: Skin is warm and dry.   Psychiatric: She has a normal mood and affect. Her behavior is normal. Judgment and thought content normal.       I have reviewed the following portions of the patient's history: allergies, current medications, past family history, past medical history, past social history, past surgical history, problem list and ROS and confirm it's accurate.      Procedure:       Assessment/Plan:   Renal stone-KUB is noncontributory and I watch her for 2 months as she is now pain-free if she persists we will get a CT scan            Patient's Body mass index is 35.27 kg/m². BMI is above normal parameters. Recommendations include: educational material.              This document has been electronically signed by JUAN CARLOS RIDER MD June 26, 2020 12:40

## 2020-06-29 PROBLEM — N20.0 RENAL CALCULUS: Status: ACTIVE | Noted: 2020-06-29

## 2020-08-25 ENCOUNTER — OFFICE VISIT (OUTPATIENT)
Dept: UROLOGY | Facility: CLINIC | Age: 45
End: 2020-08-25

## 2020-08-25 VITALS — WEIGHT: 203 LBS | HEIGHT: 63 IN | BODY MASS INDEX: 35.97 KG/M2 | TEMPERATURE: 98.3 F

## 2020-08-25 DIAGNOSIS — N20.0 RENAL CALCULUS: Primary | ICD-10-CM

## 2020-08-25 DIAGNOSIS — G89.29 CHRONIC LEFT-SIDED LOW BACK PAIN WITH SCIATICA, SCIATICA LATERALITY UNSPECIFIED: ICD-10-CM

## 2020-08-25 DIAGNOSIS — M54.40 CHRONIC LEFT-SIDED LOW BACK PAIN WITH SCIATICA, SCIATICA LATERALITY UNSPECIFIED: ICD-10-CM

## 2020-08-25 PROCEDURE — 99213 OFFICE O/P EST LOW 20 MIN: CPT | Performed by: UROLOGY

## 2020-08-25 NOTE — PROGRESS NOTES
Chief Complaint:          Chief Complaint   Patient presents with   • Flank Pain     2 month fu        HPI:   45 y.o. female has a history of a possible kidney stone KUB was not remarkable.  The pain persists I am to get a CT scan to evaluate she has pain when she sneezes it radiates down her right hip she has nausea she has vomiting she has reflux symptomatology.  I am to get the stone CT as soon as possible and follow-up with her based on this.      Past Medical History:        Past Medical History:   Diagnosis Date   • Kidney stones          Current Meds:     No current outpatient medications on file.     No current facility-administered medications for this visit.         Allergies:      Allergies   Allergen Reactions   • Naproxen Rash   • Penicillins Rash        Past Surgical History:     Past Surgical History:   Procedure Laterality Date   •  SECTION     • TUBAL ABDOMINAL LIGATION           Social History:     Social History     Socioeconomic History   • Marital status:      Spouse name: Not on file   • Number of children: Not on file   • Years of education: Not on file   • Highest education level: Not on file   Tobacco Use   • Smoking status: Never Smoker   • Smokeless tobacco: Never Used   Substance and Sexual Activity   • Alcohol use: No   • Drug use: No   • Sexual activity: Yes     Partners: Male     Birth control/protection: None       Family History:     Family History   Problem Relation Age of Onset   • Breast cancer Paternal Aunt    • Hypertension Mother        Review of Systems:     Review of Systems   Constitutional: Negative.  Negative for activity change, appetite change, chills, diaphoresis, fatigue and unexpected weight change.   HENT: Negative for congestion, dental problem, drooling, ear discharge, ear pain, facial swelling, hearing loss, mouth sores, nosebleeds, postnasal drip, rhinorrhea, sinus pressure, sneezing, sore throat, tinnitus, trouble swallowing and voice change.       Eyes: Negative.  Negative for photophobia, pain, discharge, redness, itching and visual disturbance.   Respiratory: Negative.  Negative for apnea, cough, choking, chest tightness, shortness of breath, wheezing and stridor.    Cardiovascular: Negative.  Negative for chest pain, palpitations and leg swelling.   Gastrointestinal: Negative.  Negative for abdominal distention, abdominal pain, anal bleeding, blood in stool, constipation, diarrhea, nausea, rectal pain and vomiting.   Endocrine: Negative.  Negative for cold intolerance, heat intolerance, polydipsia, polyphagia and polyuria.   Genitourinary: Positive for flank pain.   Musculoskeletal: Negative for arthralgias, back pain, gait problem, joint swelling, myalgias, neck pain and neck stiffness.   Skin: Negative.  Negative for color change, pallor, rash and wound.   Allergic/Immunologic: Negative.  Negative for environmental allergies, food allergies and immunocompromised state.   Neurological: Negative.  Negative for dizziness, tremors, seizures, syncope, facial asymmetry, speech difficulty, weakness, light-headedness, numbness and headaches.   Hematological: Negative.  Negative for adenopathy. Does not bruise/bleed easily.   Psychiatric/Behavioral: Negative for agitation, behavioral problems, confusion, decreased concentration, dysphoric mood, hallucinations, self-injury, sleep disturbance and suicidal ideas. The patient is not nervous/anxious and is not hyperactive.    All other systems reviewed and are negative.      Physical Exam:     Physical Exam   Constitutional: She appears well-developed and well-nourished.   HENT:   Head: Normocephalic and atraumatic.   Right Ear: External ear normal.   Left Ear: External ear normal.   Mouth/Throat: Oropharynx is clear and moist.   Eyes: Pupils are equal, round, and reactive to light. Conjunctivae are normal.   Cardiovascular: Normal rate, regular rhythm, normal heart sounds and intact distal pulses.    Pulmonary/Chest: Effort normal and breath sounds normal.   Abdominal: Soft. Bowel sounds are normal. She exhibits no distension and no mass. There is no tenderness. There is no rebound and no guarding.   Genitourinary: No vaginal discharge found.   Musculoskeletal: Normal range of motion.   Neurological: She is alert. She has normal reflexes.   Skin: Skin is warm and dry.   Psychiatric: She has a normal mood and affect. Her behavior is normal. Judgment and thought content normal.       I have reviewed the following portions of the patient's history: allergies, current medications, past family history, past medical history, past social history, past surgical history, problem list and ROS and confirm it's accurate.      Procedure:       Assessment/Plan:   Renal calculus-we discussed the presence of the stone we discussed the various therapeutic options available including percutaneous nephrostolithotomy, lithotripsy.  We discussed the risks of lithotripsy including the passage of stones the development of a large string of stones in the distal ureter known as Steinstrasse.  In the 3% incidence of that we will need to proceed with a ureteroscopy for obstructing fragments.  Extremely rare incidence of renal hematoma.  And the significance of this.  We discussed the absolute relative indicators for intervention including the presence of sepsis, and pain we cannot control is the primary need for urgent intervention.  We discussed placement of a stent if indicated and the management of the stent as well.  She has a putative renal calculus I am going to get a stone CT            Patient's Body mass index is 35.97 kg/m². BMI is above normal parameters. Recommendations include: educational material.              This document has been electronically signed by JUAN CARLOS RIDER MD August 25, 2020 10:04

## 2020-09-01 ENCOUNTER — OFFICE VISIT (OUTPATIENT)
Dept: FAMILY MEDICINE CLINIC | Facility: CLINIC | Age: 45
End: 2020-09-01

## 2020-09-01 ENCOUNTER — HOSPITAL ENCOUNTER (OUTPATIENT)
Dept: CT IMAGING | Facility: HOSPITAL | Age: 45
Discharge: HOME | End: 2020-09-01
Admitting: UROLOGY

## 2020-09-01 VITALS
HEART RATE: 91 BPM | OXYGEN SATURATION: 96 % | TEMPERATURE: 97.5 F | BODY MASS INDEX: 36.44 KG/M2 | WEIGHT: 198 LBS | SYSTOLIC BLOOD PRESSURE: 118 MMHG | DIASTOLIC BLOOD PRESSURE: 78 MMHG | HEIGHT: 62 IN

## 2020-09-01 DIAGNOSIS — Z12.31 SCREENING MAMMOGRAM, ENCOUNTER FOR: Primary | ICD-10-CM

## 2020-09-01 DIAGNOSIS — E66.01 MORBIDLY OBESE (HCC): ICD-10-CM

## 2020-09-01 DIAGNOSIS — N20.0 RENAL CALCULUS: ICD-10-CM

## 2020-09-01 DIAGNOSIS — R06.02 SHORTNESS OF BREATH: ICD-10-CM

## 2020-09-01 LAB
ALBUMIN SERPL-MCNC: 4 G/DL (ref 3.5–5.2)
ALBUMIN/GLOB SERPL: 1.2 G/DL
ALP SERPL-CCNC: 62 U/L (ref 39–117)
ALT SERPL W P-5'-P-CCNC: 41 U/L (ref 1–33)
ANION GAP SERPL CALCULATED.3IONS-SCNC: 12.6 MMOL/L (ref 5–15)
AST SERPL-CCNC: 17 U/L (ref 1–32)
BASOPHILS # BLD AUTO: 0.11 10*3/MM3 (ref 0–0.2)
BASOPHILS NFR BLD AUTO: 1 % (ref 0–1.5)
BILIRUB SERPL-MCNC: <0.2 MG/DL (ref 0–1.2)
BUN SERPL-MCNC: 15 MG/DL (ref 6–20)
BUN/CREAT SERPL: 15.6 (ref 7–25)
CALCIUM SPEC-SCNC: 9.8 MG/DL (ref 8.6–10.5)
CHLORIDE SERPL-SCNC: 106 MMOL/L (ref 98–107)
CHOLEST SERPL-MCNC: 218 MG/DL (ref 0–200)
CO2 SERPL-SCNC: 24.4 MMOL/L (ref 22–29)
CREAT SERPL-MCNC: 0.96 MG/DL (ref 0.57–1)
DEPRECATED RDW RBC AUTO: 38.3 FL (ref 37–54)
EOSINOPHIL # BLD AUTO: 0.48 10*3/MM3 (ref 0–0.4)
EOSINOPHIL NFR BLD AUTO: 4.2 % (ref 0.3–6.2)
ERYTHROCYTE [DISTWIDTH] IN BLOOD BY AUTOMATED COUNT: 12.7 % (ref 12.3–15.4)
GFR SERPL CREATININE-BSD FRML MDRD: 63 ML/MIN/1.73
GLOBULIN UR ELPH-MCNC: 3.4 GM/DL
GLUCOSE SERPL-MCNC: 113 MG/DL (ref 65–99)
HCT VFR BLD AUTO: 39.9 % (ref 34–46.6)
HDLC SERPL-MCNC: 40 MG/DL (ref 40–60)
HGB BLD-MCNC: 13.6 G/DL (ref 12–15.9)
IMM GRANULOCYTES # BLD AUTO: 0.08 10*3/MM3 (ref 0–0.05)
IMM GRANULOCYTES NFR BLD AUTO: 0.7 % (ref 0–0.5)
LDLC SERPL CALC-MCNC: 158 MG/DL (ref 0–100)
LDLC/HDLC SERPL: 3.95 {RATIO}
LYMPHOCYTES # BLD AUTO: 3.39 10*3/MM3 (ref 0.7–3.1)
LYMPHOCYTES NFR BLD AUTO: 29.9 % (ref 19.6–45.3)
MAGNESIUM SERPL-MCNC: 2 MG/DL (ref 1.6–2.6)
MCH RBC QN AUTO: 28.6 PG (ref 26.6–33)
MCHC RBC AUTO-ENTMCNC: 34.1 G/DL (ref 31.5–35.7)
MCV RBC AUTO: 84 FL (ref 79–97)
MONOCYTES # BLD AUTO: 0.72 10*3/MM3 (ref 0.1–0.9)
MONOCYTES NFR BLD AUTO: 6.4 % (ref 5–12)
NEUTROPHILS NFR BLD AUTO: 57.8 % (ref 42.7–76)
NEUTROPHILS NFR BLD AUTO: 6.55 10*3/MM3 (ref 1.7–7)
NRBC BLD AUTO-RTO: 0 /100 WBC (ref 0–0.2)
PLATELET # BLD AUTO: 363 10*3/MM3 (ref 140–450)
PMV BLD AUTO: 11.9 FL (ref 6–12)
POTASSIUM SERPL-SCNC: 4.8 MMOL/L (ref 3.5–5.2)
PROT SERPL-MCNC: 7.4 G/DL (ref 6–8.5)
RBC # BLD AUTO: 4.75 10*6/MM3 (ref 3.77–5.28)
SODIUM SERPL-SCNC: 143 MMOL/L (ref 136–145)
TRIGL SERPL-MCNC: 101 MG/DL (ref 0–150)
TSH SERPL DL<=0.05 MIU/L-ACNC: 2.31 UIU/ML (ref 0.27–4.2)
VIT B12 BLD-MCNC: 279 PG/ML (ref 211–946)
VLDLC SERPL-MCNC: 20.2 MG/DL (ref 5–40)
WBC # BLD AUTO: 11.33 10*3/MM3 (ref 3.4–10.8)

## 2020-09-01 PROCEDURE — 99203 OFFICE O/P NEW LOW 30 MIN: CPT | Performed by: NURSE PRACTITIONER

## 2020-09-01 PROCEDURE — 74176 CT ABD & PELVIS W/O CONTRAST: CPT

## 2020-09-01 PROCEDURE — 74176 CT ABD & PELVIS W/O CONTRAST: CPT | Performed by: RADIOLOGY

## 2020-09-01 PROCEDURE — 83735 ASSAY OF MAGNESIUM: CPT | Performed by: NURSE PRACTITIONER

## 2020-09-01 PROCEDURE — 80050 GENERAL HEALTH PANEL: CPT | Performed by: NURSE PRACTITIONER

## 2020-09-01 PROCEDURE — 80061 LIPID PANEL: CPT | Performed by: NURSE PRACTITIONER

## 2020-09-01 PROCEDURE — 93000 ELECTROCARDIOGRAM COMPLETE: CPT | Performed by: NURSE PRACTITIONER

## 2020-09-01 PROCEDURE — 82607 VITAMIN B-12: CPT | Performed by: NURSE PRACTITIONER

## 2020-09-01 NOTE — PROGRESS NOTES
Subjective   Nohemi Ospina is a 45 y.o. female.     Chief Complaint: Establish Care and Flank Pain (right )    Shortness of Breath   This is a new problem. The current episode started more than 1 month ago. The problem occurs rarely. The problem has been resolved. Pertinent negatives include no chest pain, headaches, leg swelling or vomiting. Nothing aggravates the symptoms. She has tried nothing for the symptoms.      Pt is here today to establish care today.  She has been following with [facility] but wishes to change providers.  Pt states that she has a hx of kidney stones and is currently following with urologist, Dr Mortensen.  She is scheduled for CT today.     Pt states that she is wanting to have a checkup today with labs.       Family History   Problem Relation Age of Onset   • Breast cancer Paternal Aunt    • Heart attack Father    • Hypertension Mother        Social History     Socioeconomic History   • Marital status:      Spouse name: Not on file   • Number of children: Not on file   • Years of education: Not on file   • Highest education level: Not on file   Tobacco Use   • Smoking status: Never Smoker   • Smokeless tobacco: Never Used   Substance and Sexual Activity   • Alcohol use: No   • Drug use: No   • Sexual activity: Yes     Partners: Male     Birth control/protection: Tubal ligation       Past Medical History:   Diagnosis Date   • Kidney stones        Review of Systems   Constitutional: Negative.    HENT: Negative.    Respiratory: Positive for shortness of breath.    Cardiovascular: Negative.  Negative for chest pain and leg swelling.   Gastrointestinal: Negative.  Negative for vomiting.   Musculoskeletal: Negative.    Skin: Negative.    Neurological: Negative.  Negative for headaches.   Psychiatric/Behavioral: Negative.        Objective   Physical Exam   Constitutional: She is oriented to person, place, and time. She appears well-developed and well-nourished.   obesity   Neck:  "Normal range of motion. Neck supple.   Cardiovascular: Normal rate, regular rhythm and normal heart sounds.   Pulmonary/Chest: Effort normal and breath sounds normal.   Neurological: She is alert and oriented to person, place, and time.   Skin: Skin is warm and dry.   Psychiatric: She has a normal mood and affect. Her behavior is normal. Judgment and thought content normal.   Nursing note and vitals reviewed.        ECG 12 Lead  Date/Time: 9/1/2020 10:46 AM  Performed by: Seble Mena APRN  Authorized by: Seble Mena APRN   Comparison: not compared with previous ECG   Rhythm: sinus rhythm  Rate: normal  Conduction: conduction normal  ST Segments: ST segments normal  T Waves: T waves normal  QRS axis: normal  Other: no other findings    Clinical impression: normal ECG            Vitals: Blood pressure 118/78, pulse 91, temperature 97.5 °F (36.4 °C), height 157.5 cm (62\"), weight 89.8 kg (198 lb), SpO2 96 %, not currently breastfeeding.    Body mass index is 36.21 kg/m².     Allergies:   Allergies   Allergen Reactions   • Naproxen Rash   • Penicillins Rash        During this visit the following were done:  Labs Reviewed []    Labs Ordered []    Radiology Reports Reviewed []    Radiology Ordered []    PCP Records Reviewed []    Referring Provider Records Reviewed []    ER Records Reviewed []    Hospital Records Reviewed []    History Obtained From Family []    Radiology Images Reviewed []    Other Reviewed []    Records Requested []      Assessment/Plan   Nohemi was seen today for establish care and flank pain.    Diagnoses and all orders for this visit:    Screening mammogram, encounter for  -     Mammo Screening Digital Tomosynthesis Bilateral With CAD    Morbidly obese (CMS/HCC)  -     CBC & Differential; Future  -     Comprehensive Metabolic Panel; Future  -     Lipid Panel; Future  -     Magnesium; Future  -     TSH; Future  -     Vitamin B12; Future    Shortness of breath  -     ECG 12 " Lead

## 2020-09-02 ENCOUNTER — TELEPHONE (OUTPATIENT)
Dept: FAMILY MEDICINE CLINIC | Facility: CLINIC | Age: 45
End: 2020-09-02

## 2020-09-02 DIAGNOSIS — R73.09 ELEVATED GLUCOSE: Primary | ICD-10-CM

## 2020-09-02 LAB — HBA1C MFR BLD: 5.77 % (ref 4.8–5.6)

## 2020-09-02 PROCEDURE — 83036 HEMOGLOBIN GLYCOSYLATED A1C: CPT | Performed by: NURSE PRACTITIONER

## 2020-09-02 RX ORDER — ATORVASTATIN CALCIUM 20 MG/1
20 TABLET, FILM COATED ORAL NIGHTLY
Qty: 30 TABLET | Refills: 2 | Status: ON HOLD | OUTPATIENT
Start: 2020-09-02 | End: 2022-03-12

## 2020-09-02 NOTE — PROGRESS NOTES
Her labs appear to be stable with the exception of her lipid panel.  She does have an elevated cholesterol and I would suggest starting a statin; atorvastatin 20 mg nightly if she is agreeable.Also she does have an A1c of 5.7; therefore, she is a prediabetic at this time.  She needs to limit her sugar and sweet intake.Please let her know.

## 2020-09-02 NOTE — TELEPHONE ENCOUNTER
----- Message from Seble KENAN Lloyd sent at 9/2/2020 10:19 AM EDT -----  Her labs appear to be stable with the exception of her lipid panel.  She does have an elevated cholesterol and I would suggest starting a statin; atorvastatin 20 mg nightly if she is agreeable.Also she does have an A1c of 5.7; therefore, she is a pr_  ediabetic at this time.  She needs to limit her sugar and sweet intake.Please let her know.      Called patient to make aware, no answer VM BOX IS FULL.       Patient returned call and I made her aware, she voiced understanding and is agreeable to atorvastatin. Medication sent per protocol.

## 2020-09-03 ENCOUNTER — OFFICE VISIT (OUTPATIENT)
Dept: UROLOGY | Facility: CLINIC | Age: 45
End: 2020-09-03

## 2020-09-03 VITALS — HEIGHT: 62 IN | WEIGHT: 198 LBS | TEMPERATURE: 98.9 F | BODY MASS INDEX: 36.44 KG/M2

## 2020-09-03 DIAGNOSIS — D25.9 LEIOMYOMA OF UTERUS AFFECTING PREGNANCY, ANTEPARTUM: Primary | ICD-10-CM

## 2020-09-03 DIAGNOSIS — O34.10 LEIOMYOMA OF UTERUS AFFECTING PREGNANCY, ANTEPARTUM: Primary | ICD-10-CM

## 2020-09-03 PROCEDURE — 99213 OFFICE O/P EST LOW 20 MIN: CPT | Performed by: UROLOGY

## 2020-09-03 NOTE — PROGRESS NOTES
Chief Complaint:          Chief Complaint   Patient presents with   • Nephrolithiasis       HPI:   45 y.o. female who had a possible stone with a negative KUB the pain was more pelvic it persists is bilateral I went ahead and got a stone CT which was negative for stones but she had a very large uterus and I am suspecting this is all uterine in origin I am and refer her to Dr. Hawkins for the possible hysterectomy      Past Medical History:        Past Medical History:   Diagnosis Date   • Kidney stones          Current Meds:     Current Outpatient Medications   Medication Sig Dispense Refill   • atorvastatin (LIPITOR) 20 MG tablet Take 1 tablet by mouth Every Night. 30 tablet 2     No current facility-administered medications for this visit.         Allergies:      Allergies   Allergen Reactions   • Naproxen Rash   • Penicillins Rash        Past Surgical History:     Past Surgical History:   Procedure Laterality Date   •  SECTION     • TUBAL ABDOMINAL LIGATION           Social History:     Social History     Socioeconomic History   • Marital status:      Spouse name: Not on file   • Number of children: Not on file   • Years of education: Not on file   • Highest education level: Not on file   Tobacco Use   • Smoking status: Never Smoker   • Smokeless tobacco: Never Used   Substance and Sexual Activity   • Alcohol use: No   • Drug use: No   • Sexual activity: Yes     Partners: Male     Birth control/protection: Tubal ligation       Family History:     Family History   Problem Relation Age of Onset   • Breast cancer Paternal Aunt    • Heart attack Father    • Hypertension Mother        Review of Systems:     Review of Systems   Constitutional: Negative.  Negative for activity change, appetite change, chills, diaphoresis, fatigue and unexpected weight change.   HENT: Negative for congestion, dental problem, drooling, ear discharge, ear pain, facial swelling, hearing loss, mouth sores, nosebleeds,  postnasal drip, rhinorrhea, sinus pressure, sneezing, sore throat, tinnitus, trouble swallowing and voice change.    Eyes: Negative.  Negative for photophobia, pain, discharge, redness, itching and visual disturbance.   Respiratory: Negative.  Negative for apnea, cough, choking, chest tightness, shortness of breath, wheezing and stridor.    Cardiovascular: Negative.  Negative for chest pain, palpitations and leg swelling.   Gastrointestinal: Negative.  Negative for abdominal distention, abdominal pain, anal bleeding, blood in stool, constipation, diarrhea, nausea, rectal pain and vomiting.   Endocrine: Negative.  Negative for cold intolerance, heat intolerance, polydipsia, polyphagia and polyuria.   Genitourinary: Positive for flank pain and pelvic pain.   Musculoskeletal: Negative for arthralgias, back pain, gait problem, joint swelling, myalgias, neck pain and neck stiffness.   Skin: Negative.  Negative for color change, pallor, rash and wound.   Allergic/Immunologic: Negative.  Negative for environmental allergies, food allergies and immunocompromised state.   Neurological: Negative.  Negative for dizziness, tremors, seizures, syncope, facial asymmetry, speech difficulty, weakness, light-headedness, numbness and headaches.   Hematological: Negative.  Negative for adenopathy. Does not bruise/bleed easily.   Psychiatric/Behavioral: Negative for agitation, behavioral problems, confusion, decreased concentration, dysphoric mood, hallucinations, self-injury, sleep disturbance and suicidal ideas. The patient is not nervous/anxious and is not hyperactive.    All other systems reviewed and are negative.      Physical Exam:     Physical Exam   Constitutional: She appears well-developed and well-nourished.   HENT:   Head: Normocephalic and atraumatic.   Right Ear: External ear normal.   Left Ear: External ear normal.   Mouth/Throat: Oropharynx is clear and moist.   Eyes: Pupils are equal, round, and reactive to light.  Conjunctivae are normal.   Cardiovascular: Normal rate, regular rhythm, normal heart sounds and intact distal pulses.   Pulmonary/Chest: Effort normal and breath sounds normal.   Abdominal: Soft. Bowel sounds are normal. She exhibits no distension and no mass. There is no tenderness. There is no rebound and no guarding.   Genitourinary: No vaginal discharge found.   Musculoskeletal: Normal range of motion.   Neurological: She is alert. She has normal reflexes.   Skin: Skin is warm and dry.   Psychiatric: She has a normal mood and affect. Her behavior is normal. Judgment and thought content normal.       I have reviewed the following portions of the patient's history: allergies, current medications, past family history, past medical history, past social history, past surgical history, problem list and ROS and confirm it's accurate.      Procedure:       Assessment/Plan:   Uterine fibroids-referred to Dr. Hawkins possible hysterectomy, likely the cause of her pelvic pain            Patient's Body mass index is 36.21 kg/m². BMI is above normal parameters. Recommendations include: educational material.              This document has been electronically signed by JUAN CARLOS RIDER MD September 3, 2020 09:14

## 2020-09-08 PROBLEM — D25.9 LEIOMYOMA OF UTERUS AFFECTING PREGNANCY, ANTEPARTUM: Status: ACTIVE | Noted: 2020-09-08

## 2020-09-08 PROBLEM — O34.10 LEIOMYOMA OF UTERUS AFFECTING PREGNANCY, ANTEPARTUM: Status: ACTIVE | Noted: 2020-09-08

## 2022-03-11 ENCOUNTER — APPOINTMENT (OUTPATIENT)
Dept: CT IMAGING | Facility: HOSPITAL | Age: 47
End: 2022-03-11

## 2022-03-11 ENCOUNTER — APPOINTMENT (OUTPATIENT)
Dept: ULTRASOUND IMAGING | Facility: HOSPITAL | Age: 47
End: 2022-03-11

## 2022-03-11 ENCOUNTER — HOSPITAL ENCOUNTER (INPATIENT)
Facility: HOSPITAL | Age: 47
LOS: 4 days | Discharge: HOME | End: 2022-03-15
Attending: EMERGENCY MEDICINE | Admitting: INTERNAL MEDICINE

## 2022-03-11 DIAGNOSIS — K57.92 DIVERTICULITIS: Primary | ICD-10-CM

## 2022-03-11 PROBLEM — A41.9 SEPSIS WITHOUT ACUTE ORGAN DYSFUNCTION (HCC): Status: ACTIVE | Noted: 2022-03-11

## 2022-03-11 LAB
ALBUMIN SERPL-MCNC: 4.09 G/DL (ref 3.5–5.2)
ALBUMIN/GLOB SERPL: 1 G/DL
ALP SERPL-CCNC: 95 U/L (ref 39–117)
ALT SERPL W P-5'-P-CCNC: 18 U/L (ref 1–33)
AMYLASE SERPL-CCNC: 33 U/L (ref 28–100)
ANION GAP SERPL CALCULATED.3IONS-SCNC: 12.6 MMOL/L (ref 5–15)
AST SERPL-CCNC: 12 U/L (ref 1–32)
B-HCG UR QL: NEGATIVE
BACTERIA UR QL AUTO: ABNORMAL /HPF
BASOPHILS # BLD AUTO: 0.06 10*3/MM3 (ref 0–0.2)
BASOPHILS NFR BLD AUTO: 0.4 % (ref 0–1.5)
BILIRUB SERPL-MCNC: 0.3 MG/DL (ref 0–1.2)
BILIRUB UR QL STRIP: NEGATIVE
BUN SERPL-MCNC: 13 MG/DL (ref 6–20)
BUN/CREAT SERPL: 14.9 (ref 7–25)
CALCIUM SPEC-SCNC: 9.3 MG/DL (ref 8.6–10.5)
CHLORIDE SERPL-SCNC: 100 MMOL/L (ref 98–107)
CLARITY UR: CLEAR
CO2 SERPL-SCNC: 25.4 MMOL/L (ref 22–29)
COLOR UR: ABNORMAL
CREAT SERPL-MCNC: 0.87 MG/DL (ref 0.57–1)
CRP SERPL-MCNC: 21.88 MG/DL (ref 0–0.5)
D-LACTATE SERPL-SCNC: 0.7 MMOL/L (ref 0.5–2)
DEPRECATED RDW RBC AUTO: 41.3 FL (ref 37–54)
EGFRCR SERPLBLD CKD-EPI 2021: 83.9 ML/MIN/1.73
EOSINOPHIL # BLD AUTO: 0.27 10*3/MM3 (ref 0–0.4)
EOSINOPHIL NFR BLD AUTO: 1.8 % (ref 0.3–6.2)
ERYTHROCYTE [DISTWIDTH] IN BLOOD BY AUTOMATED COUNT: 13.2 % (ref 12.3–15.4)
ERYTHROCYTE [SEDIMENTATION RATE] IN BLOOD: 47 MM/HR (ref 0–20)
FLUAV RNA RESP QL NAA+PROBE: NOT DETECTED
FLUBV RNA RESP QL NAA+PROBE: NOT DETECTED
GLOBULIN UR ELPH-MCNC: 4 GM/DL
GLUCOSE SERPL-MCNC: 93 MG/DL (ref 65–99)
GLUCOSE UR STRIP-MCNC: NEGATIVE MG/DL
HCT VFR BLD AUTO: 40.4 % (ref 34–46.6)
HGB BLD-MCNC: 13 G/DL (ref 12–15.9)
HGB UR QL STRIP.AUTO: ABNORMAL
HOLD SPECIMEN: NORMAL
HOLD SPECIMEN: NORMAL
HYALINE CASTS UR QL AUTO: ABNORMAL /LPF
IMM GRANULOCYTES # BLD AUTO: 0.07 10*3/MM3 (ref 0–0.05)
IMM GRANULOCYTES NFR BLD AUTO: 0.5 % (ref 0–0.5)
KETONES UR QL STRIP: ABNORMAL
LEUKOCYTE ESTERASE UR QL STRIP.AUTO: NEGATIVE
LIPASE SERPL-CCNC: 20 U/L (ref 13–60)
LYMPHOCYTES # BLD AUTO: 3.01 10*3/MM3 (ref 0.7–3.1)
LYMPHOCYTES NFR BLD AUTO: 19.8 % (ref 19.6–45.3)
MAGNESIUM SERPL-MCNC: 2 MG/DL (ref 1.6–2.6)
MCH RBC QN AUTO: 27.9 PG (ref 26.6–33)
MCHC RBC AUTO-ENTMCNC: 32.2 G/DL (ref 31.5–35.7)
MCV RBC AUTO: 86.7 FL (ref 79–97)
MONOCYTES # BLD AUTO: 0.74 10*3/MM3 (ref 0.1–0.9)
MONOCYTES NFR BLD AUTO: 4.9 % (ref 5–12)
NEUTROPHILS NFR BLD AUTO: 11.08 10*3/MM3 (ref 1.7–7)
NEUTROPHILS NFR BLD AUTO: 72.6 % (ref 42.7–76)
NITRITE UR QL STRIP: NEGATIVE
NRBC BLD AUTO-RTO: 0 /100 WBC (ref 0–0.2)
PH UR STRIP.AUTO: <=5 [PH] (ref 5–8)
PLATELET # BLD AUTO: 356 10*3/MM3 (ref 140–450)
PMV BLD AUTO: 10.3 FL (ref 6–12)
POTASSIUM SERPL-SCNC: 3.3 MMOL/L (ref 3.5–5.2)
PROT SERPL-MCNC: 8.1 G/DL (ref 6–8.5)
PROT UR QL STRIP: ABNORMAL
RBC # BLD AUTO: 4.66 10*6/MM3 (ref 3.77–5.28)
RBC # UR STRIP: ABNORMAL /HPF
REF LAB TEST METHOD: ABNORMAL
SARS-COV-2 RNA RESP QL NAA+PROBE: NOT DETECTED
SODIUM SERPL-SCNC: 138 MMOL/L (ref 136–145)
SP GR UR STRIP: 1.03 (ref 1–1.03)
SQUAMOUS #/AREA URNS HPF: ABNORMAL /HPF
UROBILINOGEN UR QL STRIP: ABNORMAL
WBC # UR STRIP: ABNORMAL /HPF
WBC NRBC COR # BLD: 15.23 10*3/MM3 (ref 3.4–10.8)
WHOLE BLOOD HOLD SPECIMEN: NORMAL
WHOLE BLOOD HOLD SPECIMEN: NORMAL

## 2022-03-11 PROCEDURE — 81001 URINALYSIS AUTO W/SCOPE: CPT | Performed by: NURSE PRACTITIONER

## 2022-03-11 PROCEDURE — 99284 EMERGENCY DEPT VISIT MOD MDM: CPT

## 2022-03-11 PROCEDURE — 85025 COMPLETE CBC W/AUTO DIFF WBC: CPT | Performed by: NURSE PRACTITIONER

## 2022-03-11 PROCEDURE — 87040 BLOOD CULTURE FOR BACTERIA: CPT | Performed by: PHYSICIAN ASSISTANT

## 2022-03-11 PROCEDURE — 80053 COMPREHEN METABOLIC PANEL: CPT | Performed by: NURSE PRACTITIONER

## 2022-03-11 PROCEDURE — 25010000002 MORPHINE PER 10 MG: Performed by: EMERGENCY MEDICINE

## 2022-03-11 PROCEDURE — 83690 ASSAY OF LIPASE: CPT | Performed by: NURSE PRACTITIONER

## 2022-03-11 PROCEDURE — 83735 ASSAY OF MAGNESIUM: CPT | Performed by: INTERNAL MEDICINE

## 2022-03-11 PROCEDURE — 85652 RBC SED RATE AUTOMATED: CPT | Performed by: NURSE PRACTITIONER

## 2022-03-11 PROCEDURE — 74176 CT ABD & PELVIS W/O CONTRAST: CPT

## 2022-03-11 PROCEDURE — 82150 ASSAY OF AMYLASE: CPT | Performed by: NURSE PRACTITIONER

## 2022-03-11 PROCEDURE — 93005 ELECTROCARDIOGRAM TRACING: CPT | Performed by: INTERNAL MEDICINE

## 2022-03-11 PROCEDURE — 36415 COLL VENOUS BLD VENIPUNCTURE: CPT

## 2022-03-11 PROCEDURE — 99223 1ST HOSP IP/OBS HIGH 75: CPT | Performed by: INTERNAL MEDICINE

## 2022-03-11 PROCEDURE — 83605 ASSAY OF LACTIC ACID: CPT | Performed by: PHYSICIAN ASSISTANT

## 2022-03-11 PROCEDURE — 86140 C-REACTIVE PROTEIN: CPT | Performed by: NURSE PRACTITIONER

## 2022-03-11 PROCEDURE — 81025 URINE PREGNANCY TEST: CPT | Performed by: NURSE PRACTITIONER

## 2022-03-11 PROCEDURE — 76705 ECHO EXAM OF ABDOMEN: CPT

## 2022-03-11 PROCEDURE — 87636 SARSCOV2 & INF A&B AMP PRB: CPT | Performed by: NURSE PRACTITIONER

## 2022-03-11 RX ORDER — SODIUM CHLORIDE 0.9 % (FLUSH) 0.9 %
10 SYRINGE (ML) INJECTION AS NEEDED
Status: DISCONTINUED | OUTPATIENT
Start: 2022-03-11 | End: 2022-03-15 | Stop reason: HOSPADM

## 2022-03-11 RX ORDER — SODIUM CHLORIDE 0.9 % (FLUSH) 0.9 %
10 SYRINGE (ML) INJECTION EVERY 12 HOURS SCHEDULED
Status: DISCONTINUED | OUTPATIENT
Start: 2022-03-12 | End: 2022-03-15 | Stop reason: HOSPADM

## 2022-03-11 RX ORDER — SODIUM CHLORIDE 9 MG/ML
100 INJECTION, SOLUTION INTRAVENOUS CONTINUOUS
Status: DISCONTINUED | OUTPATIENT
Start: 2022-03-12 | End: 2022-03-13

## 2022-03-11 RX ORDER — ONDANSETRON 2 MG/ML
4 INJECTION INTRAMUSCULAR; INTRAVENOUS EVERY 6 HOURS PRN
Status: DISCONTINUED | OUTPATIENT
Start: 2022-03-11 | End: 2022-03-15 | Stop reason: HOSPADM

## 2022-03-11 RX ORDER — HEPARIN SODIUM 5000 [USP'U]/ML
5000 INJECTION, SOLUTION INTRAVENOUS; SUBCUTANEOUS EVERY 12 HOURS SCHEDULED
Status: DISCONTINUED | OUTPATIENT
Start: 2022-03-12 | End: 2022-03-15 | Stop reason: HOSPADM

## 2022-03-11 RX ORDER — NICOTINE POLACRILEX 4 MG
15 LOZENGE BUCCAL
Status: DISCONTINUED | OUTPATIENT
Start: 2022-03-11 | End: 2022-03-15 | Stop reason: HOSPADM

## 2022-03-11 RX ORDER — METRONIDAZOLE 500 MG/100ML
500 INJECTION, SOLUTION INTRAVENOUS ONCE
Status: COMPLETED | OUTPATIENT
Start: 2022-03-11 | End: 2022-03-11

## 2022-03-11 RX ORDER — CIPROFLOXACIN 500 MG/1
500 TABLET, FILM COATED ORAL ONCE
Status: COMPLETED | OUTPATIENT
Start: 2022-03-11 | End: 2022-03-11

## 2022-03-11 RX ORDER — NITROGLYCERIN 0.4 MG/1
0.4 TABLET SUBLINGUAL
Status: DISCONTINUED | OUTPATIENT
Start: 2022-03-11 | End: 2022-03-15 | Stop reason: HOSPADM

## 2022-03-11 RX ORDER — MORPHINE SULFATE 2 MG/ML
2 INJECTION, SOLUTION INTRAMUSCULAR; INTRAVENOUS
Status: DISCONTINUED | OUTPATIENT
Start: 2022-03-11 | End: 2022-03-15 | Stop reason: HOSPADM

## 2022-03-11 RX ORDER — POTASSIUM CHLORIDE 7.45 MG/ML
10 INJECTION INTRAVENOUS
Status: DISCONTINUED | OUTPATIENT
Start: 2022-03-11 | End: 2022-03-15 | Stop reason: HOSPADM

## 2022-03-11 RX ORDER — DEXTROSE MONOHYDRATE 25 G/50ML
25 INJECTION, SOLUTION INTRAVENOUS
Status: DISCONTINUED | OUTPATIENT
Start: 2022-03-11 | End: 2022-03-15 | Stop reason: HOSPADM

## 2022-03-11 RX ORDER — METRONIDAZOLE 500 MG/100ML
500 INJECTION, SOLUTION INTRAVENOUS EVERY 8 HOURS
Status: DISCONTINUED | OUTPATIENT
Start: 2022-03-12 | End: 2022-03-15 | Stop reason: HOSPADM

## 2022-03-11 RX ADMIN — METRONIDAZOLE 500 MG: 500 INJECTION, SOLUTION INTRAVENOUS at 21:38

## 2022-03-11 RX ADMIN — MORPHINE SULFATE 4 MG: 4 INJECTION, SOLUTION INTRAMUSCULAR; INTRAVENOUS at 21:37

## 2022-03-11 RX ADMIN — CIPROFLOXACIN 500 MG: 500 TABLET, FILM COATED ORAL at 21:23

## 2022-03-11 RX ADMIN — SODIUM CHLORIDE 1000 ML: 9 INJECTION, SOLUTION INTRAVENOUS at 21:37

## 2022-03-11 NOTE — ED NOTES
MEDICAL SCREENING:    Reason for Visit: Abdominal Pain    Patient initially seen in triage.  The patient was advised further evaluation and diagnostic testing will be needed, some of the treatment and testing will be initiated in the lobby in order to begin the process.  The patient will be returned to the waiting area for the time being and possibly be re-assessed by a subsequent ED provider.  The patient will be brought back to the treatment area in as timely manner as possible.       Mary Wade, APRN  03/11/22 1829

## 2022-03-12 LAB
ALBUMIN SERPL-MCNC: 2.97 G/DL (ref 3.5–5.2)
ALBUMIN/GLOB SERPL: 0.9 G/DL
ALP SERPL-CCNC: 137 U/L (ref 39–117)
ALT SERPL W P-5'-P-CCNC: 14 U/L (ref 1–33)
ANION GAP SERPL CALCULATED.3IONS-SCNC: 11.7 MMOL/L (ref 5–15)
AST SERPL-CCNC: 10 U/L (ref 1–32)
BASOPHILS # BLD AUTO: 0.06 10*3/MM3 (ref 0–0.2)
BASOPHILS NFR BLD AUTO: 0.5 % (ref 0–1.5)
BILIRUB SERPL-MCNC: 0.2 MG/DL (ref 0–1.2)
BUN SERPL-MCNC: 12 MG/DL (ref 6–20)
BUN/CREAT SERPL: 16.2 (ref 7–25)
CALCIUM SPEC-SCNC: 8.2 MG/DL (ref 8.6–10.5)
CHLORIDE SERPL-SCNC: 104 MMOL/L (ref 98–107)
CK SERPL-CCNC: 35 U/L (ref 20–180)
CO2 SERPL-SCNC: 22.3 MMOL/L (ref 22–29)
CREAT SERPL-MCNC: 0.74 MG/DL (ref 0.57–1)
DEPRECATED RDW RBC AUTO: 42.8 FL (ref 37–54)
EGFRCR SERPLBLD CKD-EPI 2021: 101.8 ML/MIN/1.73
EOSINOPHIL # BLD AUTO: 0.27 10*3/MM3 (ref 0–0.4)
EOSINOPHIL NFR BLD AUTO: 2.2 % (ref 0.3–6.2)
ERYTHROCYTE [DISTWIDTH] IN BLOOD BY AUTOMATED COUNT: 13.2 % (ref 12.3–15.4)
GLOBULIN UR ELPH-MCNC: 3.3 GM/DL
GLUCOSE BLDC GLUCOMTR-MCNC: 104 MG/DL (ref 70–130)
GLUCOSE BLDC GLUCOMTR-MCNC: 117 MG/DL (ref 70–130)
GLUCOSE BLDC GLUCOMTR-MCNC: 71 MG/DL (ref 70–130)
GLUCOSE BLDC GLUCOMTR-MCNC: 75 MG/DL (ref 70–130)
GLUCOSE BLDC GLUCOMTR-MCNC: 96 MG/DL (ref 70–130)
GLUCOSE SERPL-MCNC: 100 MG/DL (ref 65–99)
HCT VFR BLD AUTO: 34.3 % (ref 34–46.6)
HGB BLD-MCNC: 10.8 G/DL (ref 12–15.9)
IMM GRANULOCYTES # BLD AUTO: 0.06 10*3/MM3 (ref 0–0.05)
IMM GRANULOCYTES NFR BLD AUTO: 0.5 % (ref 0–0.5)
LYMPHOCYTES # BLD AUTO: 2.42 10*3/MM3 (ref 0.7–3.1)
LYMPHOCYTES NFR BLD AUTO: 19.7 % (ref 19.6–45.3)
MCH RBC QN AUTO: 27.8 PG (ref 26.6–33)
MCHC RBC AUTO-ENTMCNC: 31.5 G/DL (ref 31.5–35.7)
MCV RBC AUTO: 88.2 FL (ref 79–97)
MONOCYTES # BLD AUTO: 0.78 10*3/MM3 (ref 0.1–0.9)
MONOCYTES NFR BLD AUTO: 6.3 % (ref 5–12)
NEUTROPHILS NFR BLD AUTO: 70.8 % (ref 42.7–76)
NEUTROPHILS NFR BLD AUTO: 8.72 10*3/MM3 (ref 1.7–7)
NRBC BLD AUTO-RTO: 0 /100 WBC (ref 0–0.2)
PLATELET # BLD AUTO: 295 10*3/MM3 (ref 140–450)
PMV BLD AUTO: 10.7 FL (ref 6–12)
POTASSIUM SERPL-SCNC: 3.5 MMOL/L (ref 3.5–5.2)
POTASSIUM SERPL-SCNC: 3.8 MMOL/L (ref 3.5–5.2)
PROT SERPL-MCNC: 6.3 G/DL (ref 6–8.5)
QT INTERVAL: 366 MS
QTC INTERVAL: 440 MS
RBC # BLD AUTO: 3.89 10*6/MM3 (ref 3.77–5.28)
SODIUM SERPL-SCNC: 138 MMOL/L (ref 136–145)
WBC NRBC COR # BLD: 12.31 10*3/MM3 (ref 3.4–10.8)

## 2022-03-12 PROCEDURE — 82962 GLUCOSE BLOOD TEST: CPT

## 2022-03-12 PROCEDURE — 85025 COMPLETE CBC W/AUTO DIFF WBC: CPT | Performed by: INTERNAL MEDICINE

## 2022-03-12 PROCEDURE — 25010000002 MORPHINE PER 10 MG: Performed by: INTERNAL MEDICINE

## 2022-03-12 PROCEDURE — 80053 COMPREHEN METABOLIC PANEL: CPT | Performed by: INTERNAL MEDICINE

## 2022-03-12 PROCEDURE — 94799 UNLISTED PULMONARY SVC/PX: CPT

## 2022-03-12 PROCEDURE — 25010000002 CEFTRIAXONE PER 250 MG: Performed by: INTERNAL MEDICINE

## 2022-03-12 PROCEDURE — 0 POTASSIUM CHLORIDE 10 MEQ/100ML SOLUTION: Performed by: INTERNAL MEDICINE

## 2022-03-12 PROCEDURE — 99221 1ST HOSP IP/OBS SF/LOW 40: CPT | Performed by: SURGERY

## 2022-03-12 PROCEDURE — 84132 ASSAY OF SERUM POTASSIUM: CPT | Performed by: INTERNAL MEDICINE

## 2022-03-12 PROCEDURE — 82550 ASSAY OF CK (CPK): CPT | Performed by: INTERNAL MEDICINE

## 2022-03-12 PROCEDURE — 25010000002 HEPARIN (PORCINE) PER 1000 UNITS: Performed by: INTERNAL MEDICINE

## 2022-03-12 RX ORDER — ACETAMINOPHEN 325 MG/1
650 TABLET ORAL EVERY 6 HOURS PRN
Status: DISCONTINUED | OUTPATIENT
Start: 2022-03-12 | End: 2022-03-12 | Stop reason: SDUPTHER

## 2022-03-12 RX ORDER — POTASSIUM CHLORIDE 7.45 MG/ML
10 INJECTION INTRAVENOUS
Status: DISPENSED | OUTPATIENT
Start: 2022-03-12 | End: 2022-03-12

## 2022-03-12 RX ORDER — ACETAMINOPHEN 325 MG/1
650 TABLET ORAL EVERY 6 HOURS PRN
Status: DISCONTINUED | OUTPATIENT
Start: 2022-03-12 | End: 2022-03-15 | Stop reason: HOSPADM

## 2022-03-12 RX ORDER — CYCLOBENZAPRINE HCL 10 MG
10 TABLET ORAL ONCE
Status: COMPLETED | OUTPATIENT
Start: 2022-03-12 | End: 2022-03-12

## 2022-03-12 RX ORDER — CYCLOBENZAPRINE HCL 10 MG
10 TABLET ORAL 3 TIMES DAILY PRN
Status: DISCONTINUED | OUTPATIENT
Start: 2022-03-12 | End: 2022-03-15 | Stop reason: HOSPADM

## 2022-03-12 RX ADMIN — SODIUM CHLORIDE 100 ML/HR: 9 INJECTION, SOLUTION INTRAVENOUS at 00:01

## 2022-03-12 RX ADMIN — POTASSIUM CHLORIDE 10 MEQ: 7.46 INJECTION, SOLUTION INTRAVENOUS at 10:33

## 2022-03-12 RX ADMIN — POTASSIUM CHLORIDE 10 MEQ: 7.46 INJECTION, SOLUTION INTRAVENOUS at 15:32

## 2022-03-12 RX ADMIN — MORPHINE SULFATE 2 MG: 2 INJECTION, SOLUTION INTRAMUSCULAR; INTRAVENOUS at 00:10

## 2022-03-12 RX ADMIN — SODIUM CHLORIDE 100 ML/HR: 9 INJECTION, SOLUTION INTRAVENOUS at 16:53

## 2022-03-12 RX ADMIN — Medication 10 ML: at 00:01

## 2022-03-12 RX ADMIN — Medication 10 ML: at 09:00

## 2022-03-12 RX ADMIN — HEPARIN SODIUM 5000 UNITS: 5000 INJECTION INTRAVENOUS; SUBCUTANEOUS at 00:02

## 2022-03-12 RX ADMIN — METRONIDAZOLE 500 MG: 500 INJECTION, SOLUTION INTRAVENOUS at 20:36

## 2022-03-12 RX ADMIN — POTASSIUM CHLORIDE 10 MEQ: 7.46 INJECTION, SOLUTION INTRAVENOUS at 08:52

## 2022-03-12 RX ADMIN — CYCLOBENZAPRINE 10 MG: 10 TABLET, FILM COATED ORAL at 03:00

## 2022-03-12 RX ADMIN — MORPHINE SULFATE 2 MG: 2 INJECTION, SOLUTION INTRAMUSCULAR; INTRAVENOUS at 19:31

## 2022-03-12 RX ADMIN — POTASSIUM CHLORIDE 10 MEQ: 7.46 INJECTION, SOLUTION INTRAVENOUS at 13:43

## 2022-03-12 RX ADMIN — POTASSIUM CHLORIDE 10 MEQ: 7.46 INJECTION, SOLUTION INTRAVENOUS at 13:42

## 2022-03-12 RX ADMIN — HEPARIN SODIUM 5000 UNITS: 5000 INJECTION INTRAVENOUS; SUBCUTANEOUS at 20:36

## 2022-03-12 RX ADMIN — CEFTRIAXONE 2 G: 10 INJECTION, POWDER, FOR SOLUTION INTRAVENOUS at 00:37

## 2022-03-12 RX ADMIN — METRONIDAZOLE 500 MG: 500 INJECTION, SOLUTION INTRAVENOUS at 14:36

## 2022-03-12 RX ADMIN — METRONIDAZOLE 500 MG: 500 INJECTION, SOLUTION INTRAVENOUS at 05:17

## 2022-03-12 NOTE — CONSULTS
Consulting physician:  Dr. Gomez    Referring physician: hospitalist    Date of consultation: 22     Chief complaint diverticulitis with contained perforation    Subjective     Patient is a 46 y.o. female who presented to the emergency room on 3/11/2022 with worsening left lower quadrant pain over a 48-hour..  Patient denies nausea or vomiting.  She has had some diarrhea.  No fever or chills CT scan of the abdomen and pelvis demonstrated sigmoid diverticulitis with contained perforation and no definite abscess.  Patient has no prior history of diverticulitis.  She denies pneumaturia.  She states that the pain is somewhat better today.  She was started on Cipro and Flagyl in the emergency room.  Female      Review of Systems  Review of Systems - General ROS: negative for - weight loss  Psychological ROS: negative for - behavioral disorder  Ophthalmic ROS: negative for - dry eyes  ENT ROS: negative for - vertigo or vocal changes  Hematological and Lymphatic ROS: negative for - swollen lymph nodes, DVT, PE.   Respiratory ROS: negative for - sputum changes or stridor.  Cardiovascular ROS: negative for - irregular heartbeat or murmur  Gastrointestinal ROS: negative for - blood in stools or change in stools  Genitourinary ROS: Positive for hematuria for which patient has had a work-up and has been evaluated by urology.  Musculoskeletal ROS: negative for - gait disturbance      History  Past Medical History:   Diagnosis Date   • Kidney stones      Past Surgical History:   Procedure Laterality Date   •  SECTION     • TUBAL ABDOMINAL LIGATION       Family History   Problem Relation Age of Onset   • Breast cancer Paternal Aunt    • Heart attack Father    • Hypertension Mother      Social History     Tobacco Use   • Smoking status: Never Smoker   • Smokeless tobacco: Never Used   Substance Use Topics   • Alcohol use: No   • Drug use: No     No medications prior to admission.     Allergies:  Naproxen and  Penicillins    Objective     Vital Signs  Temp:  [97.7 °F (36.5 °C)-98.2 °F (36.8 °C)] 97.7 °F (36.5 °C)  Heart Rate:  [84-96] 84  Resp:  [16-24] 18  BP: (118-162)/(50-81) 119/65    Physical Exam:  General:  This is a WD WN female in no acute distress  Vital signs: Stable, afebrile  HEENT exam:  WNL. Sclerae are anicteric.  EOMI  Neck:  Supple, FROM.  No JVD.  Trachea midline  Lungs:  Respiratory effort normal. Auscultation: Clear, without wheezes, rhonchi, rales  Heart:  Regular rate and rhythm, without murmur, gallop, rub.  No pedal edema  Abdomen: Bowel sounds are present and normal.  Patient has focal left lower quadrant tenderness with guarding.  There is no generalized peritonitis although she does report some tenderness to palpation on the right side of the abdomen.  No palpable mass  Musculoskeletal:  Muscle strength/tone is normal.    Psych:  Alert, oriented x 3.  Mood and affect are appropriate  Skin:  Warm with good turgor.  Without rash or lesion  Extremities:  Examination of the extremities revealed no cyanosis, clubbing or edema.    Results Review:   Results from last 7 days   Lab Units 03/12/22 0433   CK TOTAL U/L 35     Results from last 7 days   Lab Units 03/12/22 0433 03/11/22 2121 03/11/22 2012   CRP mg/dL  --   --  21.88*   LACTATE mmol/L  --  0.7  --    WBC 10*3/mm3 12.31*  --  15.23*   HEMOGLOBIN g/dL 10.8*  --  13.0   HEMATOCRIT % 34.3  --  40.4   PLATELETS 10*3/mm3 295  --  356         Results from last 7 days   Lab Units 03/12/22 0433 03/11/22 2012   SODIUM mmol/L 138 138   POTASSIUM mmol/L 3.5 3.3*   MAGNESIUM mg/dL  --  2.0   CHLORIDE mmol/L 104 100   CO2 mmol/L 22.3 25.4   BUN mg/dL 12 13   CREATININE mg/dL 0.74 0.87   CALCIUM mg/dL 8.2* 9.3   GLUCOSE mg/dL 100* 93   ALBUMIN g/dL 2.97* 4.09   BILIRUBIN mg/dL 0.2 0.3   ALK PHOS U/L 137* 95   AST (SGOT) U/L 10 12   ALT (SGPT) U/L 14 18   Estimated Creatinine Clearance: 99.7 mL/min (by C-G formula based on SCr of 0.74 mg/dL).  No  results found for: AMMONIA      No results found for: BLOODCX  No results found for: URINECX  No results found for: WOUNDCX  No results found for: STOOLCX    Imaging:  Imaging Results (Last 24 Hours)     Procedure Component Value Units Date/Time    US Gallbladder [[id number]] Collected: 03/11/22 1955     Updated: 03/11/22 1957    Narrative:      ULTRASOUND ABDOMEN, LIMITED, 3/11/2022    HISTORY:  46-year-old female in the ED with abdomen pain and nausea for 3 days. CT examination showing perforated low descending colonic diverticulitis.    TECHNIQUE:  Grayscale ultrasound imaging of the right upper quadrant was performed.    FINDINGS:  The gallbladder is contracted but is grossly negative. There is no visible cholelithiasis, convincing gallbladder wall thickening or adjacent fluid collection. There is no intrahepatic or extra hepatic bile duct dilatation (CBD 3 mm).     Visualized portions of the liver and pancreas appear normal and are normal on the earlier CT study.      Impression:      Negative gallbladder ultrasound examination.    Signer Name: Leonardo Alcantara MD   Signed: 3/11/2022 7:55 PM   Workstation [facility]: [facility]    [facility]    CT Abdomen Pelvis Stone Protocol [[id number]] Collected: 03/11/22 1944     Updated: 03/11/22 1946    Narrative:      CT ABDOMEN AND PELVIS, NONCONTRAST, 3/11/2022    HISTORY:  46-year-old female in the ED complaining of abdomen pain and nausea for about 3 days. Hematuria is noted.    TECHNIQUE:  CT imaging of the abdomen and pelvis ordered without IV contrast. Radiation dose reduction techniques included automated exposure control. Radiation audit for CT and nuclear cardiology exams in the last 12 months: 0.    ABDOMEN FINDINGS:  Examination shows severe perforated diverticulitis involving the low descending colon within the left upper pelvis laterally. Findings include marked short segment colon wall thickening, several enlarged and inflamed  colonic diverticula and extensive  pericolonic inflammatory soft tissue stranding. There is a small volume of contained extraluminal air along the mesenteric border of the inflamed colon segment, but no significant fluid collection or drainable abscess is visible at this time. There is no  bowel dilatation to suggest obstruction.    Mild to moderate sigmoid colonic diverticulosis. Normal appendix. No gastric distention or distal esophageal dilatation.    Kidneys, ureters and urinary bladder are normal in noncontrast CT appearance. No visible nephrolithiasis or evidence of urinary obstruction.    No gallbladder distention or bile duct dilatation. Liver, pancreas and spleen are within normal limits. Normal caliber abdominal aorta. Small fat-containing umbilical hernia.    PELVIS FINDINGS:  Uterus, ovaries, urinary bladder and rectum are within normal limits. No free pelvic fluid. No inguinal hernia.    Lung base images show no active disease.      Impression:      1.  Severe segmental acute diverticulitis involving the low descending colon with evidence of localized perforation as detailed above. No drainable fluid collection is present at this time.  2.  Sigmoid and descending colonic diverticulosis. Normal appendix. No colon dilatation.    Signer Name: Leonardo Alcantara MD   Signed: 3/11/2022 7:44 PM   Workstation Name: [facility]    [facility]          CT report and images were reviewed.  There is a moderate amount of extraluminal air, more than usual with contained perforation      Impression:  Patient Active Problem List   Diagnosis Code   • Chronic left-sided low back pain with sciatica M54.40, G89.29   • Gross hematuria R31.0   • Infective urethritis N34.2   • Renal calculus N20.0   • Morbidly obese ([facility]) E66.01   • Leiomyoma of uterus affecting pregnancy, antepartum O34.10, D25.9   • Diverticulitis K57.92   • Sepsis without acute organ dysfunction ([facility]) A41.9     Diverticulitis  with walled off perforation    Plan:  This patient is clinically improved will continue IV antibiotics and keep patient n.p.o.  Will repeat CT of the abdomen and pelvis in 24 hours with both oral and IV contrast.  If there is improvement in CT on that time would start a clear liquid diet.      Discussion:  Treatment course discussed with patient who understands that if the perforation converts to a free perforation she will require emergent surgery    Dinora Gomez MD  03/12/22  11:58 EST    Time: Time spent:    Please note that portions of this note were completed with a voice recognition program.

## 2022-03-12 NOTE — PLAN OF CARE
Goal Outcome Evaluation:  Plan of Care Reviewed With: patient        Progress: no change  Outcome Evaluation: pt npo. K replacement ordered per protocol. will monitor

## 2022-03-12 NOTE — PLAN OF CARE
Goal Outcome Evaluation:  Plan of Care Reviewed With: patient        Progress: improving  Outcome Evaluation: Remains NPO potassium IV replacement.  Denies pain at present.  Ambulated in hallway and back to room.  Tolerated well.

## 2022-03-12 NOTE — ED PROVIDER NOTES
Addended by: FLORINA MCGOVERN on: 8/5/2020 10:54 AM     Modules accepted: Orders     Subjective   Patient is a 46-year-old female that presents to the ED with complaints of the left lower quadrant abdominal pain.  She states is been ongoing since Wednesday night.  She [facility] as well as urinalysis.  She states that pain is been getting persistently worse and she now has associated nausea.  She denies chest pain, shortness of breath, fever, chills, vomiting, headache, dizziness, dysuria, diarrhea, or constipation.      History provided by:  Patient   used: No        Review of Systems   Constitutional: Negative.  Negative for chills, diaphoresis, fatigue and fever.   HENT: Negative.  Negative for congestion, drooling, ear discharge, ear pain, postnasal drip, rhinorrhea, sinus pressure, sinus pain, sneezing, sore throat, tinnitus and trouble swallowing.    Eyes: Negative.  Negative for pain, discharge, redness and itching.   Respiratory: Negative.  Negative for cough, shortness of breath and wheezing.    Cardiovascular: Negative.  Negative for chest pain.   Gastrointestinal: Positive for abdominal pain and nausea. Negative for constipation, diarrhea and vomiting.   Endocrine: Negative.    Genitourinary: Positive for hematuria. Negative for difficulty urinating, dysuria, flank pain and frequency.   Musculoskeletal: Negative.  Negative for arthralgias, back pain, gait problem, joint swelling, myalgias and neck pain.   Skin: Negative.    Neurological: Negative.  Negative for dizziness, tremors, seizures, syncope, facial asymmetry, speech difficulty, weakness, light-headedness, numbness and headaches.   Psychiatric/Behavioral: Negative.  Negative for confusion.   All other systems reviewed and are negative.      Past Medical History:   Diagnosis Date   • Kidney stones        Allergies   Allergen Reactions   • Naproxen Rash   • Penicillins Rash       Past Surgical History:   Procedure Laterality Date   •  SECTION     •  TUBAL ABDOMINAL LIGATION         Family History   Problem Relation Age of Onset   • Breast cancer Paternal Aunt    • Heart attack Father    • Hypertension Mother        Social History     Socioeconomic History   • Marital status:    Tobacco Use   • Smoking status: Never Smoker   • Smokeless tobacco: Never Used   Substance and Sexual Activity   • Alcohol use: No   • Drug use: No   • Sexual activity: Yes     Partners: Male     Birth control/protection: Tubal ligation           Objective   Physical Exam  Vitals and nursing note reviewed.   Constitutional:       General: She is not in acute distress.     Appearance: She is well-developed. She is not diaphoretic.   HENT:      Head: Normocephalic and atraumatic.      Right Ear: External ear normal.      Left Ear: External ear normal.      Nose: Nose normal.      Mouth/Throat:      Mouth: Mucous membranes are moist.      Pharynx: Oropharynx is clear.   Eyes:      Extraocular Movements: Extraocular movements intact.      Conjunctiva/sclera: Conjunctivae normal.      Pupils: Pupils are equal, round, and reactive to light.   Neck:      Vascular: No JVD.      Trachea: No tracheal deviation.   Cardiovascular:      Rate and Rhythm: Normal rate and regular rhythm.      Pulses: Normal pulses.      Heart sounds: Normal heart sounds. No murmur heard.  Pulmonary:      Effort: Pulmonary effort is normal. No respiratory distress.      Breath sounds: Normal breath sounds. No wheezing.   Abdominal:      General: Bowel sounds are normal. There is no distension.      Palpations: Abdomen is soft.      Tenderness: There is abdominal tenderness in the left lower quadrant. There is no right CVA tenderness, left CVA tenderness, guarding or rebound.      Hernia: No hernia is present.   Musculoskeletal:         General: No deformity. Normal range of motion.      Cervical back: Normal range of motion and neck supple.   Skin:     General: Skin is warm and dry.      Coloration: Skin is not  pale.      Findings: No erythema or rash.   Neurological:      General: No focal deficit present.      Mental Status: She is alert and oriented to person, place, and time.      Cranial Nerves: No cranial nerve deficit.   Psychiatric:         Behavior: Behavior normal.         Thought Content: Thought content normal.         Procedures           ED Course  ED Course as of 03/11/22 2216   Fri Mar 11, 2022   1948 CT Abdomen Pelvis Stone Protocol  IMPRESSION:  1.  Severe segmental acute diverticulitis involving the low descending colon with evidence of localized perforation as detailed above. No drainable fluid collection is present at this time.  2.  Sigmoid and descending colonic diverticulosis. Normal appendix. No colon dilatation.     Signer Name: Leonardo Alcantara MD   Signed: 3/11/2022 7:44 PM [MH]   2013 US Gallbladder  IMPRESSION:  Negative gallbladder ultrasound examination.     Signer Name: Leonardo Alcantara MD   Signed: 3/11/2022 7:55 PM [MH]   2047 Discussed with Dr. Gomez concerning CT findings.  She states she will need to be admitted under hospitalist services.  We will start IV antibiotics and watch closely for any changes. []   2216 Discussed with Dr. Reid.  She accepts patient is agreeable to admission. []      ED Course User Index  [MH] Trinity Kiser PA-C                                                 [facility]    Final diagnoses:   Diverticulitis       ED Disposition  ED Disposition     ED Disposition   Decision to Admit    Condition   --    Comment   --             No follow-up provider specified.       Medication List      No changes were made to your prescriptions during this visit.          Trinity Kiser PA-C  03/11/22 2216

## 2022-03-12 NOTE — PROGRESS NOTES
"Patient admitted by Dr. Reid after midnight. I have seen and evaluated patient today. She reports feeling much better. She reports she would like to avoid ostomy bag \"if at all possible\". She denies very mild residual LLQ pain. Denies N/V.  Updated plan of care below:    #Sepsis 2/2 diverticulitis with localized perforation   - Improving symptoms. Leukocytosis improving. Continue ceftriaxone and flagyl.   - Surgery following. Appreciate recs. Repeat CT abdomen/pelvis w/ IV and PO contrast in the AM.   - Keep NPO for now.     #Hypokalemia  - Replacement per protocol     #L leg pain/weakness   - CK wnl. Likely MSK related given chronicity. PrN flexaril.     #Elevated blood pressure   - Likely related to pain. Improved today.     #Obesity   - Complicates all aspects of care     #hx of nephrolithisis     #HLD  - Continue home regimen     F: 100 cc/hr NS maintenance   E: replace as needed   N: NPO    Code status: Full     Dispo: Pending clinical improvement.   "

## 2022-03-12 NOTE — H&P
[facility]   HISTORY AND PHYSICAL    Patient Name: Nohemi Ospina  : 1975  MRN: [id number]  Primary Care Physician:  Seble Mena APRN  Date of admission: 3/11/2022    Subjective   Subjective     Chief Complaint: Abdominal pain    History of Present Illness the patient is a 46-year-old female with past medical history significant for hyperlipidemia who presents to the emergency department complaining of progressive abdominal pain.    The patient states that her abdominal pain started 2 days prior to admission.  She describes the abdominal pain as being similar to a muscle spasm or contraction.  She states the abdominal pain is generalized but worse in the left lower quadrant.  She reports that she has had fever and chills with nausea.  She denied vomiting.  She states that she has had a 1 to 2-day history of watery diarrhea without hematochezia and/or melena.    Upon further questioning, the patient does report to some chronic urge incontinence.  She also reports a 1 to 2-day history of some left lower extremity weakness and pain.  Patient states that she fell on ice approximately 2 months ago but has had no other known injury.  She denies any saddle anesthesia, urinary and/or fecal incontinence.    Patient denies any shortness of air and/or cough.  She reports occasional sharp chest pain with change in position.    Patient seen and examined in room 3 40-2.    Review of Systems   Constitutional: Positive for chills and fever. Negative for diaphoresis.   HENT: Negative for hearing loss, tinnitus and trouble swallowing.    Eyes: Negative for photophobia and discharge.   Respiratory: Negative for cough, shortness of breath and wheezing.    Cardiovascular: Positive for chest pain (Sharp, atypical, intermittent). Negative for palpitations and leg swelling.   Gastrointestinal: Positive for abdominal pain, diarrhea and nausea. Negative for blood in stool, constipation and vomiting.   Endocrine: Negative  for polydipsia, polyphagia and polyuria.   Genitourinary: Positive for urgency (Chronic). Negative for dysuria, frequency and hematuria.   Musculoskeletal: Positive for myalgias (Left thigh). Negative for gait problem and neck pain.   Skin: Negative for rash and wound.   Neurological: Positive for weakness. Negative for dizziness, tremors, seizures, syncope and light-headedness.   Hematological: Does not bruise/bleed easily.   Psychiatric/Behavioral: Negative for confusion, hallucinations and suicidal ideas.      Personal History     Past Medical History:   Diagnosis Date   • Kidney stones        Past Surgical History:   Procedure Laterality Date   •  SECTION     • TUBAL ABDOMINAL LIGATION         Family History: family history includes Breast cancer in her paternal aunt; Heart attack in her father; Hypertension in her mother. Otherwise pertinent FHx was reviewed and not pertinent to current issue.    Social History:  reports that she has never smoked. She has never used smokeless tobacco. She reports that she does not drink alcohol and does not use drugs.    Home Medications:  atorvastatin    Allergies:  Allergies   Allergen Reactions   • Naproxen Rash   • Penicillins Rash       Objective    Objective     Vitals:   Temp:  [98.2 °F (36.8 °C)] 98.2 °F (36.8 °C)  Heart Rate:  [96] 96  Resp:  [16] 16  BP: (162)/(81) 162/81    Physical Exam  Constitutional:       General: She is not in acute distress.     Appearance: She is well-developed.   HENT:      Head: Normocephalic and atraumatic.   Eyes:      Conjunctiva/sclera: Conjunctivae normal.   Neck:      Trachea: No tracheal deviation.   Cardiovascular:      Rate and Rhythm: Normal rate and regular rhythm.      Pulses:           Dorsalis pedis pulses are 2+ on the right side and 2+ on the left side.      Heart sounds: No murmur heard.    No friction rub. No gallop.   Pulmonary:      Effort: No respiratory distress.      Breath sounds: Normal breath sounds. No  wheezing or rales.   Abdominal:      General: Bowel sounds are normal. There is no distension.      Palpations: Abdomen is soft.      Tenderness: There is generalized abdominal tenderness and tenderness in the right upper quadrant and left lower quadrant. There is no guarding.   Musculoskeletal:      Right lower leg: No edema.      Left lower leg: Tenderness (along IT band, anterior thigh, and left buttock) present. No edema.   Skin:     General: Skin is warm and dry.      Findings: No erythema or rash.   Neurological:      Mental Status: She is alert and oriented to person, place, and time.      Cranial Nerves: No cranial nerve deficit.         Result Review    Result Review:  I have personally reviewed the results from the time of this admission to 3/11/2022 23:43 EST and agree with these findings:  [x]  Laboratory  []  Microbiology  [x]  Radiology  []  EKG/Telemetry   []  Cardiology/Vascular   []  Pathology  []  Old records  []  Other:  Most notable findings include: K 3.3, C-RP 21.88, WBC 15.23.    CT images personally reviewed; severe diverticula, with left sided inflammation and perforation.    CT abdomen/pelvis stone protocol:  ABDOMEN FINDINGS:  Examination shows severe perforated diverticulitis involving the low descending colon within the left upper pelvis laterally. Findings include marked short segment colon wall thickening, several enlarged and inflamed colonic diverticula and extensive  pericolonic inflammatory soft tissue stranding. There is a small volume of contained extraluminal air along the mesenteric border of the inflamed colon segment, but no significant fluid collection or drainable abscess is visible at this time. There is no  bowel dilatation to suggest obstruction.     Mild to moderate sigmoid colonic diverticulosis. Normal appendix. No gastric distention or distal esophageal dilatation.     Kidneys, ureters and urinary bladder are normal in noncontrast CT appearance. No visible  nephrolithiasis or evidence of urinary obstruction.     No gallbladder distention or bile duct dilatation. Liver, pancreas and spleen are within normal limits. Normal caliber abdominal aorta. Small fat-containing umbilical hernia.     PELVIS FINDINGS:  Uterus, ovaries, urinary bladder and rectum are within normal limits. No free pelvic fluid. No inguinal hernia.     Lung base images show no active disease.     IMPRESSION:  1.  Severe segmental acute diverticulitis involving the low descending colon with evidence of localized perforation as detailed above. No drainable fluid collection is present at this time.  2.  Sigmoid and descending colonic diverticulosis. Normal appendix. No colon dilatation.     Assessment / Plan     -Sepsis, present upon admission and secondary to severe acute diverticulitis with localized perforation    Patient has been admitted to the medical surgical floor with telemetry.  NPO, IV fluids with normal saline at 100 mL/hour, high-dose Rocephin and intravenous Metronidazole. Continue with as needed pain control and antiemetics.  General surgery consultation placed.  Lactate level within normal limits at 0.7. Repeat CBC in a.m. and continue to monitor her temperature curve. Will place on Q6H accu-checks while NPO with the hypoglycemia protocol as needed. Discussed dietary modification.     -Acute hypokalemia, 3.3    Replace with IV KCL for now given NPO status. Obtain Mg level and supplement if found to be deficient. Monitor on telemetry.    -Samuel leg pain/weakness    Suspect musculoskeletal in nature. Will begin as needed flexeril. Patient states has a history of sciatica and has tolerated flexeril in the past. Will check CK level.    -Elevated BP without history of HTN; likely due to abdominal pain  -Obesity    Chronic medical problems:  -Nephrolithiasis - urologic workup per patient report  -Hyperlipidemia    DVT prophylaxis:  Subcutaneous heparin    Patient considered to be high risk due  to: sepsis    CODE STATUS:    Code Status (Patient has no pulse and is not breathing): CPR (Attempt to Resuscitate)  Medical Interventions (Patient has pulse or is breathing): Full Support    Admission Status:  I believe this patient meets inpatient status.    Carla Reid, DO

## 2022-03-12 NOTE — PAYOR COMM NOTE
"[facility]  KENNEY TOLEDO  PHONE  [phone]  FAX  [phone]  NPI:  [id number]    ADMISSION NOTIFICATION    Luda Ospina (46 y.o. Female)             Date of Birth   1975    Social Security Number       Address   [facility] [address] [address]    Home Phone   [phone]    MRN   [id number]       Mormonism   None    Marital Status                               Admission Date   3/11/22    Admission Type   Emergency    Admitting Provider   Carla Reid DO    Attending Provider   Morris Mobley MD    Department, Room/Bed   [address], [address]       Discharge Date       Discharge Disposition       Discharge Destination                               Attending Provider: Morris Mobley MD    Allergies: Naproxen, Penicillins    Isolation: None   Infection: COVID (rule out) (03/11/22)   Code Status: CPR   Advance Care Planning Activity    Ht: 157.5 cm (62\")   Wt: 89.4 kg (197 lb)    Admission Cmt: None   Principal Problem: Sepsis without acute organ dysfunction (HCC) [A41.9]                 Active Insurance as of 3/11/2022     Primary Coverage     Payor Plan Insurance Group Employer/Plan Group    [employer] [place] [employer] [place]ICA[place]      Payor Plan Address Payor Plan Phone Number Payor Plan Fax Number Effective Dates     [address] [address] [phone]  11/27/2018 - None Entered    [facility] [id number]       Subscriber Name Subscriber Birth Date Member ID       LUDA OSPINA 1975 [id number]                 Emergency Contacts      (Rel.) Home Phone Work Phone Mobile Phone    Arslan Ospina (Spouse) [phone] -- --    NEPTALI BARAJAS (Daughter) -- -- [phone]              "

## 2022-03-13 ENCOUNTER — APPOINTMENT (OUTPATIENT)
Dept: CT IMAGING | Facility: HOSPITAL | Age: 47
End: 2022-03-13

## 2022-03-13 LAB
ALBUMIN SERPL-MCNC: 3.04 G/DL (ref 3.5–5.2)
ALBUMIN/GLOB SERPL: 0.9 G/DL
ALP SERPL-CCNC: 68 U/L (ref 39–117)
ALT SERPL W P-5'-P-CCNC: 15 U/L (ref 1–33)
ANION GAP SERPL CALCULATED.3IONS-SCNC: 11.9 MMOL/L (ref 5–15)
AST SERPL-CCNC: 15 U/L (ref 1–32)
BASOPHILS # BLD AUTO: 0.06 10*3/MM3 (ref 0–0.2)
BASOPHILS NFR BLD AUTO: 0.6 % (ref 0–1.5)
BILIRUB SERPL-MCNC: 0.2 MG/DL (ref 0–1.2)
BUN SERPL-MCNC: 10 MG/DL (ref 6–20)
BUN/CREAT SERPL: 15.2 (ref 7–25)
CALCIUM SPEC-SCNC: 8.4 MG/DL (ref 8.6–10.5)
CHLORIDE SERPL-SCNC: 105 MMOL/L (ref 98–107)
CO2 SERPL-SCNC: 20.1 MMOL/L (ref 22–29)
CREAT SERPL-MCNC: 0.66 MG/DL (ref 0.57–1)
DEPRECATED RDW RBC AUTO: 42.1 FL (ref 37–54)
EGFRCR SERPLBLD CKD-EPI 2021: 110.4 ML/MIN/1.73
EOSINOPHIL # BLD AUTO: 0.31 10*3/MM3 (ref 0–0.4)
EOSINOPHIL NFR BLD AUTO: 3.1 % (ref 0.3–6.2)
ERYTHROCYTE [DISTWIDTH] IN BLOOD BY AUTOMATED COUNT: 12.9 % (ref 12.3–15.4)
GLOBULIN UR ELPH-MCNC: 3.4 GM/DL
GLUCOSE BLDC GLUCOMTR-MCNC: 75 MG/DL (ref 70–130)
GLUCOSE BLDC GLUCOMTR-MCNC: 76 MG/DL (ref 70–130)
GLUCOSE BLDC GLUCOMTR-MCNC: 81 MG/DL (ref 70–130)
GLUCOSE BLDC GLUCOMTR-MCNC: 96 MG/DL (ref 70–130)
GLUCOSE SERPL-MCNC: 89 MG/DL (ref 65–99)
HCT VFR BLD AUTO: 35 % (ref 34–46.6)
HGB BLD-MCNC: 11 G/DL (ref 12–15.9)
IMM GRANULOCYTES # BLD AUTO: 0.05 10*3/MM3 (ref 0–0.05)
IMM GRANULOCYTES NFR BLD AUTO: 0.5 % (ref 0–0.5)
LYMPHOCYTES # BLD AUTO: 2.21 10*3/MM3 (ref 0.7–3.1)
LYMPHOCYTES NFR BLD AUTO: 22 % (ref 19.6–45.3)
MCH RBC QN AUTO: 27.8 PG (ref 26.6–33)
MCHC RBC AUTO-ENTMCNC: 31.4 G/DL (ref 31.5–35.7)
MCV RBC AUTO: 88.4 FL (ref 79–97)
MONOCYTES # BLD AUTO: 0.66 10*3/MM3 (ref 0.1–0.9)
MONOCYTES NFR BLD AUTO: 6.6 % (ref 5–12)
NEUTROPHILS NFR BLD AUTO: 6.74 10*3/MM3 (ref 1.7–7)
NEUTROPHILS NFR BLD AUTO: 67.2 % (ref 42.7–76)
NRBC BLD AUTO-RTO: 0 /100 WBC (ref 0–0.2)
PLATELET # BLD AUTO: 308 10*3/MM3 (ref 140–450)
PMV BLD AUTO: 10.4 FL (ref 6–12)
POTASSIUM SERPL-SCNC: 3.7 MMOL/L (ref 3.5–5.2)
PROT SERPL-MCNC: 6.4 G/DL (ref 6–8.5)
RBC # BLD AUTO: 3.96 10*6/MM3 (ref 3.77–5.28)
SODIUM SERPL-SCNC: 137 MMOL/L (ref 136–145)
WBC NRBC COR # BLD: 10.03 10*3/MM3 (ref 3.4–10.8)

## 2022-03-13 PROCEDURE — 25010000002 CEFTRIAXONE PER 250 MG: Performed by: INTERNAL MEDICINE

## 2022-03-13 PROCEDURE — 25010000002 HEPARIN (PORCINE) PER 1000 UNITS: Performed by: INTERNAL MEDICINE

## 2022-03-13 PROCEDURE — 99232 SBSQ HOSP IP/OBS MODERATE 35: CPT | Performed by: INTERNAL MEDICINE

## 2022-03-13 PROCEDURE — 80053 COMPREHEN METABOLIC PANEL: CPT | Performed by: INTERNAL MEDICINE

## 2022-03-13 PROCEDURE — 25010000002 IOPAMIDOL 61 % SOLUTION: Performed by: INTERNAL MEDICINE

## 2022-03-13 PROCEDURE — 99232 SBSQ HOSP IP/OBS MODERATE 35: CPT | Performed by: SURGERY

## 2022-03-13 PROCEDURE — 82962 GLUCOSE BLOOD TEST: CPT

## 2022-03-13 PROCEDURE — 85025 COMPLETE CBC W/AUTO DIFF WBC: CPT | Performed by: INTERNAL MEDICINE

## 2022-03-13 PROCEDURE — 94799 UNLISTED PULMONARY SVC/PX: CPT

## 2022-03-13 PROCEDURE — 74177 CT ABD & PELVIS W/CONTRAST: CPT

## 2022-03-13 PROCEDURE — 25010000002 MORPHINE PER 10 MG: Performed by: INTERNAL MEDICINE

## 2022-03-13 RX ADMIN — ACETAMINOPHEN 650 MG: 325 TABLET ORAL at 09:00

## 2022-03-13 RX ADMIN — IOPAMIDOL 86 ML: 612 INJECTION, SOLUTION INTRAVENOUS at 10:49

## 2022-03-13 RX ADMIN — SODIUM CHLORIDE 100 ML/HR: 9 INJECTION, SOLUTION INTRAVENOUS at 16:37

## 2022-03-13 RX ADMIN — HEPARIN SODIUM 5000 UNITS: 5000 INJECTION INTRAVENOUS; SUBCUTANEOUS at 20:29

## 2022-03-13 RX ADMIN — MORPHINE SULFATE 2 MG: 2 INJECTION, SOLUTION INTRAMUSCULAR; INTRAVENOUS at 00:27

## 2022-03-13 RX ADMIN — MORPHINE SULFATE 2 MG: 2 INJECTION, SOLUTION INTRAMUSCULAR; INTRAVENOUS at 05:42

## 2022-03-13 RX ADMIN — Medication 10 ML: at 08:55

## 2022-03-13 RX ADMIN — METRONIDAZOLE 500 MG: 500 INJECTION, SOLUTION INTRAVENOUS at 12:05

## 2022-03-13 RX ADMIN — MORPHINE SULFATE 2 MG: 2 INJECTION, SOLUTION INTRAMUSCULAR; INTRAVENOUS at 23:13

## 2022-03-13 RX ADMIN — CEFTRIAXONE 2 G: 10 INJECTION, POWDER, FOR SOLUTION INTRAVENOUS at 00:27

## 2022-03-13 RX ADMIN — METRONIDAZOLE 500 MG: 500 INJECTION, SOLUTION INTRAVENOUS at 20:29

## 2022-03-13 RX ADMIN — SODIUM CHLORIDE 100 ML/HR: 9 INJECTION, SOLUTION INTRAVENOUS at 04:47

## 2022-03-13 RX ADMIN — METRONIDAZOLE 500 MG: 500 INJECTION, SOLUTION INTRAVENOUS at 04:47

## 2022-03-13 RX ADMIN — HEPARIN SODIUM 5000 UNITS: 5000 INJECTION INTRAVENOUS; SUBCUTANEOUS at 08:54

## 2022-03-13 NOTE — PROGRESS NOTES
LOS: 2 days   Patient Care Team:  Seble Mena APRN as PCP - General (Family Medicine)    Surgery follow up for diverticulitis with contained perforation    Subjective     Interval History:  Patient doing well.  Has had several bowel movements. No fever or chills.  Repeat CT done today with contrast which demonstrates essentially no change and significant extraluminal contained perforation    History taken from patient.      Review of Systems:   Review of systems negative except for history of present illness    Objective     Vital Signs  Temp:  [97.9 °F (36.6 °C)-98.6 °F (37 °C)] 98.5 °F (36.9 °C)  Heart Rate:  [82-91] 82  Resp:  [18-20] 20  BP: (119-136)/(64-69) 129/65    Physical Exam:  General:  This is a WD WN female in no acute distress  Vital signs stable, afebrile  HEENT exam:  WNL. Sclera are anicteric.  EOMI  Neck:  supple, FROM.  No JVD.  Trachea midline  Lungs:  Respiratory effort normal. Auscultation: Clear, without wheezes, rhonchi, rales  Heart:  Regular rate and rhythm, without murmur, gallop, rub.  No pedal edema  Abdomen: Bowel sounds are present.  Minimal left lower quadrant tenderness.  CT report and images were reviewed.  Musculoskeletal:  muscle strength/tone is normal.    Psyc:  alert, oriented x 3.  Mood and affect are appropriate  skin:  Warm with good turgor.  Without rash or lesion  extremities:  Examination of the extremities revealed no cyanosis, clubbing or edema.     Results Review:    Results from last 7 days   Lab Units 03/12/22 0433   CK TOTAL U/L 35     Results from last 7 days   Lab Units 03/13/22 0310 03/12/22 0433 03/11/22 2121 03/11/22 2012   CRP mg/dL  --   --   --  21.88*   LACTATE mmol/L  --   --  0.7  --    WBC 10*3/mm3 10.03 12.31*  --  15.23*   HEMOGLOBIN g/dL 11.0* 10.8*  --  13.0   HEMATOCRIT % 35.0 34.3  --  40.4   PLATELETS 10*3/mm3 308 295  --  356         Results from last 7 days   Lab Units 03/13/22 0310 03/12/22 2036 03/12/22 0433  03/11/22 2012   SODIUM mmol/L 137  --  138 138   POTASSIUM mmol/L 3.7 3.8 3.5 3.3*   MAGNESIUM mg/dL  --   --   --  2.0   CHLORIDE mmol/L 105  --  104 100   CO2 mmol/L 20.1*  --  22.3 25.4   BUN mg/dL 10  --  12 13   CREATININE mg/dL 0.66  --  0.74 0.87   CALCIUM mg/dL 8.4*  --  8.2* 9.3   GLUCOSE mg/dL 89  --  100* 93   ALBUMIN g/dL 3.04*  --  2.97* 4.09   BILIRUBIN mg/dL 0.2  --  0.2 0.3   ALK PHOS U/L 68  --  137* 95   AST (SGOT) U/L 15  --  10 12   ALT (SGPT) U/L 15  --  14 18   Estimated Creatinine Clearance: 111.8 mL/min (by C-G formula based on SCr of 0.66 mg/dL).  No results found for: AMMONIA      Blood Culture   Date Value Ref Range Status   03/11/2022 No growth at 24 hours  Preliminary   03/11/2022 No growth at 24 hours  Preliminary     No results found for: URINECX  No results found for: WOUNDCX  No results found for: STOOLCX    Imaging:  Imaging Results (Last 24 Hours)     Procedure Component Value Units Date/Time    CT Abdomen Pelvis With Contrast [[id number]] Collected: 03/13/22 1102     Updated: 03/13/22 1104    Narrative:      CT Abdomen Pelvis W    INDICATION:   Follow-up of diverticulitis, abdominal pain    TECHNIQUE:   CT of the abdomen and pelvis with IV contrast. Coronal and sagittal reconstructions were obtained.  Radiation dose reduction techniques included automated exposure control or exposure modulation based on body size. Radiation audit for CT and nuclear  cardiology exams in the last 12 months: 1.     COMPARISON:   March 11, 2022    FINDINGS:    Persistent inflammatory changes present at the level of the distal descending colon/proximal sigmoid colon. There are are multiple foci of extraluminal air consistent with at least some degree of perforation at the site but no discrete abscess is seen.  Overall the appearances very similar to the study of 2 days ago. Small amount of free fluid in the left paracolic gutter.    Other intra-abdominal findings are not appreciably changed. Hepatic  steatosis. No acute process of the gallbladder, pancreas, spleen, adrenal glands or kidneys. No evidence of bowel obstruction. Normal appendix. Uterus is present. Tubal ligation clips.  No large adnexal mass.    No destructive osseous lesion. Atelectatic changes in the lung bases.      Impression:        1.  Persistent inflammatory changes present at the level of the distal descending colon/proximal sigmoid colon. There are are multiple foci of extraluminal air consistent with at least some degree of perforation at the site but no discrete abscess is  seen. Overall the appearances very similar to the study of 2 days ago.        Signer Name: WYATT RHODES MD   Signed: 3/13/2022 11:02 AM   Workstation [facility]: [facility]    [facility]               Impression:  Diverticulitis with contained perforation.  Patient clinically stable    Plan:  Start clear liquid diet    Dinora Gomez MD  03/13/22  12:37 EDT      Please note that portions of this note were completed with a voice recognition program.

## 2022-03-13 NOTE — PLAN OF CARE
Goal Outcome Evaluation:  Plan of Care Reviewed With: patient        Progress: improving  Outcome Evaluation: Ambulating to bathroom tolerated well.  States BM x 1.  CT abdomen today for evaluation.  Denies abdominal pain.   Continue to monitor.

## 2022-03-13 NOTE — PROGRESS NOTES
[facility] HOSPITALIST PROGRESS NOTE     Patient Identification:  Name:  Nohemi Ospina  Age:  46 y.o.  Sex:  female  :  1975  MRN:  [id number]  Visit Number:  [id number]  ROOM: [address] [place] [address]     Primary Care Provider:  Seble Mena APRN    Length of stay in inpatient status:  2    Subjective     Chief Compliant:    Chief Complaint   Patient presents with   • Abdominal Pain       History of Presenting Illness:    Patient denies any new complaints. Reports tolerating liquids since starting diet earlier in the day. Reports pain essentially resolved.    ROS:  Otherwise 10 point ROS negative other than documented above in HPI.     Objective     Current Hospital Meds:cefTRIAXone, 2 g, Intravenous, Q24H  heparin (porcine), 5,000 Units, Subcutaneous, Q12H  metroNIDAZOLE, 500 mg, Intravenous, Q8H  sodium chloride, 10 mL, Intravenous, Q12H         Current Antimicrobial Therapy:  Anti-Infectives (From admission, onward)    Ordered     Dose/Rate Route Frequency Start Stop    22  metroNIDAZOLE (FLAGYL) IVPB 500 mg        Ordering Provider: Carla Reid DO    500 mg Intravenous Every 8 Hours 22 0530 22 0529    22  cefTRIAXone (ROCEPHIN) in SWFI 2 GRAMS/20ml IV PUSH syringe        Ordering Provider: Carla Reid DO    2 g  over 5 Minutes Intravenous Every 24 Hours 22 0030 22 0029    22  metroNIDAZOLE (FLAGYL) IVPB 500 mg        Ordering Provider: Trinity Kiser PA-C    500 mg  over 60 Minutes Intravenous Once 228    22  ciprofloxacin (CIPRO) tablet 500 mg        Ordering Provider: Trinity Kiser PA-C    500 mg Oral Once 22        Current Diuretic Therapy:  Diuretics (From admission, onward)    None        ----------------------------------------------------------------------------------------------------------------------  Vital Signs:  Temp:  [97.8 °F (36.6  °C)-98.5 °F (36.9 °C)] 97.8 °F (36.6 °C)  Heart Rate:  [75-91] 75  Resp:  [18-20] 20  BP: (119-136)/(53-69) 121/53  SpO2:  [96 %-99 %] 99 %  on   ;   Device (Oxygen Therapy): room air  Body mass index is 36.03 kg/m².    Wt Readings from Last 3 Encounters:   03/11/22 89.4 kg (197 lb)   09/03/20 89.8 kg (198 lb)   09/01/20 89.8 kg (198 lb)     Intake & Output (last 3 days)       03/10 0701  03/11 0700 03/11 0701 03/12 0700 03/12 0701 03/13 0700 03/13 0701 03/14 0700    P.O.  0  480    I.V. (mL/kg)   944.7 (10.6)     Total Intake(mL/kg)  0 (0) 944.7 (10.6) 480 (5.4)    Net  0 +944.7 +480            Urine Unmeasured Occurrence  1 x 4 x     Stool Unmeasured Occurrence  0 x  2 x        Diet Clear Liquid  ----------------------------------------------------------------------------------------------------------------------  Physical exam:  Constitutional:  Well-developed and well-nourished.  No respiratory distress.      HENT:  Head:  Normocephalic and atraumatic.  Mouth:  Moist mucous membranes.    Eyes:  Conjunctivae and EOM are normal. No scleral icterus.    Neck:  Neck supple.  No JVD present.    Cardiovascular:  Normal rate, regular rhythm and normal heart sounds with no murmur.  Pulmonary/Chest:  No respiratory distress, no wheezes, no crackles, with normal breath sounds and good air movement.  Abdominal:  Soft.  Bowel sounds are normal.  No distension and no tenderness.   Musculoskeletal:  No edema, no tenderness, and no deformity.  No red or swollen joints anywhere.    Neurological:  Alert and oriented to person, place, and time.  No cranial nerve deficit.  No tongue deviation.  No facial droop.  No slurred speech.   Skin:  Skin is warm and dry. No rash noted. No pallor.   Peripheral vascular:  Pulses in all 4 extremities with no clubbing, no cyanosis, no edema.  ----------------------------------------------------------------------------------------------------------------------  Tele:     ----------------------------------------------------------------------------------------------------------------------  Results from last 7 days   Lab Units 03/13/22 0310 03/12/22 0433 03/11/22 2121 03/11/22 2012   CRP mg/dL  --   --   --  21.88*   LACTATE mmol/L  --   --  0.7  --    WBC 10*3/mm3 10.03 12.31*  --  15.23*   HEMOGLOBIN g/dL 11.0* 10.8*  --  13.0   HEMATOCRIT % 35.0 34.3  --  40.4   MCV fL 88.4 88.2  --  86.7   MCHC g/dL 31.4* 31.5  --  32.2   PLATELETS 10*3/mm3 308 295  --  356         Results from last 7 days   Lab Units 03/13/22 0310 03/12/22 2036 03/12/22 0433 03/11/22 2012   SODIUM mmol/L 137  --  138 138   POTASSIUM mmol/L 3.7 3.8 3.5 3.3*   MAGNESIUM mg/dL  --   --   --  2.0   CHLORIDE mmol/L 105  --  104 100   CO2 mmol/L 20.1*  --  22.3 25.4   BUN mg/dL 10  --  12 13   CREATININE mg/dL 0.66  --  0.74 0.87   CALCIUM mg/dL 8.4*  --  8.2* 9.3   GLUCOSE mg/dL 89  --  100* 93   ALBUMIN g/dL 3.04*  --  2.97* 4.09   BILIRUBIN mg/dL 0.2  --  0.2 0.3   ALK PHOS U/L 68  --  137* 95   AST (SGOT) U/L 15  --  10 12   ALT (SGPT) U/L 15  --  14 18   Estimated Creatinine Clearance: 111.8 mL/min (by C-G formula based on SCr of 0.66 mg/dL).  No results found for: AMMONIA  Results from last 7 days   Lab Units 03/12/22  0433   CK TOTAL U/L 35             Glucose   Date/Time Value Ref Range Status   03/13/2022 1639 76 70 - 130 mg/dL Final     Comment:     Meter: [id number] : [id number] Garyle Christina Uriel   03/13/2022 1057 96 70 - 130 mg/dL Final     Comment:     Meter: [id number] : [id number] Gary Carri Uriel   03/13/2022 0721 81 70 - 130 mg/dL Final     Comment:     Meter: [id number] : [id number] PREM [place]   03/13/2022 0058 75 70 - 130 mg/dL Final     Comment:     Meter: [id number] : [id number] andrei bergman   03/12/2022 1938 71 70 - 130 mg/dL Final     Comment:     Meter: [id number] : [id number] priya menard   03/12/2022 1657 75 70 - 130 mg/dL Final     Comment:     Meter:  [id number] : [id number] Gary Trevino   03/12/2022 1036 96 70 - 130 mg/dL Final     Comment:     Meter: [id number] : [id number] Gary Trevino   03/12/2022 0521 104 70 - 130 mg/dL Final     Comment:     Meter: [id number] : [id number] rachele molina     Lab Results   Component Value Date    TSH 2.310 09/01/2020     Lab Results   Component Value Date    PREGTESTUR Negative 03/11/2022     Pain Management Panel    There is no flowsheet data to display.       Brief Urine Lab Results  (Last result in the past 365 days)      Color   Clarity   Blood   Leuk Est   Nitrite   Protein   CREAT   Urine HCG        03/11/22 2014 Dark Yellow   Clear   Large (3+)   Negative   Negative   >=300 mg/dL (3+)           03/11/22 2014               Negative           Blood Culture   Date Value Ref Range Status   03/11/2022 No growth at 24 hours  Preliminary   03/11/2022 No growth at 24 hours  Preliminary     No results found for: URINECX  No results found for: WOUNDCX  No results found for: STOOLCX  No results found for: RESPCX  No results found for: AFBCX  Results from last 7 days   Lab Units 03/11/22 2121 03/11/22 2012   LACTATE mmol/L 0.7  --    SED RATE mm/hr  --  47*   CRP mg/dL  --  21.88*       I have personally looked at the labs and they are summarized above.  ----------------------------------------------------------------------------------------------------------------------  Detailed radiology reports for the last 24 hours:    Imaging Results (Last 24 Hours)     Procedure Component Value Units Date/Time    CT Abdomen Pelvis With Contrast [[id number]] Collected: 03/13/22 1102     Updated: 03/13/22 1104    Narrative:      CT Abdomen Pelvis W    INDICATION:   Follow-up of diverticulitis, abdominal pain    TECHNIQUE:   CT of the abdomen and pelvis with IV contrast. Coronal and sagittal reconstructions were obtained.  Radiation dose reduction techniques included automated exposure control or exposure  modulation based on body size. Radiation audit for CT and nuclear  cardiology exams in the last 12 months: 1.     COMPARISON:   March 11, 2022    FINDINGS:    Persistent inflammatory changes present at the level of the distal descending colon/proximal sigmoid colon. There are are multiple foci of extraluminal air consistent with at least some degree of perforation at the site but no discrete abscess is seen.  Overall the appearances very similar to the study of 2 days ago. Small amount of free fluid in the left paracolic gutter.    Other intra-abdominal findings are not appreciably changed. Hepatic steatosis. No acute process of the gallbladder, pancreas, spleen, adrenal glands or kidneys. No evidence of bowel obstruction. Normal appendix. Uterus is present. Tubal ligation clips.  No large adnexal mass.    No destructive osseous lesion. Atelectatic changes in the lung bases.      Impression:        1.  Persistent inflammatory changes present at the level of the distal descending colon/proximal sigmoid colon. There are are multiple foci of extraluminal air consistent with at least some degree of perforation at the site but no discrete abscess is  seen. Overall the appearances very similar to the study of 2 days ago.        Signer Name: WYATT RHODES MD   Signed: 3/13/2022 11:02 AM   Workstation Name: [facility]    [facility]        Assessment & Plan    #Sepsis 2/2 diverticulitis with localized perforation   - Improving symptoms. Leukocytosis resolved. Continue ceftriaxone and flagyl.   - Surgery following. Appreciate recs. Repeat CT abdomen/pelvis w/ IV and PO contrast this AM essentially unchanged.   - Advance diet to CLD as per surgery who continues to follow.     #Hypokalemia  - Replacement per protocol     #L leg pain/weakness   - CK wnl. Likely MSK related given chronicity. PrN flexaril.     #Elevated blood pressure   - Likely related to pain. Improved today.     #Obesity   - Complicates  all aspects of care     #hx of nephrolithisis     #HLD  - Continue home regimen     F: Will stop fluids. PO.   E: replace as needed   N: CLD    Code status: Full     Dispo: Pending clinical improvement.       VTE Prophylaxis:   Mechanical Order History:     None      Pharmalogical Order History:      Ordered     Dose Route Frequency Stop    03/11/22 6206  heparin (porcine) 5000 UNIT/ML injection 5,000 Units         5,000 Units SC Every 12 Hours Scheduled --              Morris Mobley MD  [facility]  03/13/22  17:04 EDT

## 2022-03-13 NOTE — PLAN OF CARE
Goal Outcome Evaluation:      The patient has rested quietly throughout the night. Complaints of mild Abdominal pain. PRN pain medication given. The patient has remained NPO overnight. No acute events. She is scheduled to receive a CT with contrast of the Abdomen this am. Will continue to monitor.

## 2022-03-14 LAB
GLUCOSE BLDC GLUCOMTR-MCNC: 103 MG/DL (ref 70–130)
GLUCOSE BLDC GLUCOMTR-MCNC: 84 MG/DL (ref 70–130)
GLUCOSE BLDC GLUCOMTR-MCNC: 86 MG/DL (ref 70–130)
GLUCOSE BLDC GLUCOMTR-MCNC: 93 MG/DL (ref 70–130)

## 2022-03-14 PROCEDURE — 82962 GLUCOSE BLOOD TEST: CPT

## 2022-03-14 PROCEDURE — 99232 SBSQ HOSP IP/OBS MODERATE 35: CPT | Performed by: HOSPITALIST

## 2022-03-14 PROCEDURE — 25010000002 HEPARIN (PORCINE) PER 1000 UNITS: Performed by: INTERNAL MEDICINE

## 2022-03-14 PROCEDURE — 25010000002 MORPHINE PER 10 MG: Performed by: INTERNAL MEDICINE

## 2022-03-14 PROCEDURE — 99232 SBSQ HOSP IP/OBS MODERATE 35: CPT | Performed by: SURGERY

## 2022-03-14 PROCEDURE — 25010000002 CEFTRIAXONE PER 250 MG: Performed by: INTERNAL MEDICINE

## 2022-03-14 RX ADMIN — METRONIDAZOLE 500 MG: 500 INJECTION, SOLUTION INTRAVENOUS at 13:58

## 2022-03-14 RX ADMIN — METRONIDAZOLE 500 MG: 500 INJECTION, SOLUTION INTRAVENOUS at 05:19

## 2022-03-14 RX ADMIN — Medication 10 ML: at 08:19

## 2022-03-14 RX ADMIN — HEPARIN SODIUM 5000 UNITS: 5000 INJECTION INTRAVENOUS; SUBCUTANEOUS at 08:19

## 2022-03-14 RX ADMIN — CEFTRIAXONE 2 G: 10 INJECTION, POWDER, FOR SOLUTION INTRAVENOUS at 00:14

## 2022-03-14 RX ADMIN — MORPHINE SULFATE 2 MG: 2 INJECTION, SOLUTION INTRAMUSCULAR; INTRAVENOUS at 23:41

## 2022-03-14 RX ADMIN — METRONIDAZOLE 500 MG: 500 INJECTION, SOLUTION INTRAVENOUS at 21:07

## 2022-03-14 RX ADMIN — HEPARIN SODIUM 5000 UNITS: 5000 INJECTION INTRAVENOUS; SUBCUTANEOUS at 21:07

## 2022-03-14 RX ADMIN — Medication 10 ML: at 21:07

## 2022-03-14 RX ADMIN — CEFTRIAXONE 2 G: 10 INJECTION, POWDER, FOR SOLUTION INTRAVENOUS at 23:41

## 2022-03-14 NOTE — PROGRESS NOTES
LOS: 3 days   Patient Care Team:  Seble Mena APRN as PCP - General (Family Medicine)    Surgery follow up for diverticulitis with contained perforation    Subjective     Interval History:  Patient doing well.  Has had several bowel movements. No fever or chills.  Repeat CT done today with contrast which demonstrates essentially no change and significant extraluminal contained perforation.    History taken from patient.      Review of Systems:   Review of systems negative except for history of present illness    Objective     Vital Signs  Temp:  [97.1 °F (36.2 °C)-98.5 °F (36.9 °C)] 98.4 °F (36.9 °C)  Heart Rate:  [78-90] 83  Resp:  [18-20] 20  BP: (126-154)/(63-84) 140/78    Physical Exam:  General:  This is a WD WN female in no acute distress  Vital signs stable, afebrile  HEENT exam:  WNL. Sclera are anicteric.  EOMI  Neck:  supple, FROM.  No JVD.  Trachea midline  Lungs:  Respiratory effort normal. Auscultation: Clear, without wheezes, rhonchi, rales  Heart:  Regular rate and rhythm, without murmur, gallop, rub.  No pedal edema  Abdomen: Bowel sounds are present.  Minimal left lower quadrant tenderness.  CT report and images were reviewed.  Musculoskeletal:  muscle strength/tone is normal.    Psyc:  alert, oriented x 3.  Mood and affect are appropriate  skin:  Warm with good turgor.  Without rash or lesion  extremities:  Examination of the extremities revealed no cyanosis, clubbing or edema.     Results Review:    Results from last 7 days   Lab Units 03/12/22 0433   CK TOTAL U/L 35     Results from last 7 days   Lab Units 03/13/22 0310 03/12/22 0433 03/11/22 2121 03/11/22 2012   CRP mg/dL  --   --   --  21.88*   LACTATE mmol/L  --   --  0.7  --    WBC 10*3/mm3 10.03 12.31*  --  15.23*   HEMOGLOBIN g/dL 11.0* 10.8*  --  13.0   HEMATOCRIT % 35.0 34.3  --  40.4   PLATELETS 10*3/mm3 308 295  --  356         Results from last 7 days   Lab Units 03/13/22 0310 03/12/22 2036 03/12/22 0433  03/11/22 2012   SODIUM mmol/L 137  --  138 138   POTASSIUM mmol/L 3.7 3.8 3.5 3.3*   MAGNESIUM mg/dL  --   --   --  2.0   CHLORIDE mmol/L 105  --  104 100   CO2 mmol/L 20.1*  --  22.3 25.4   BUN mg/dL 10  --  12 13   CREATININE mg/dL 0.66  --  0.74 0.87   CALCIUM mg/dL 8.4*  --  8.2* 9.3   GLUCOSE mg/dL 89  --  100* 93   ALBUMIN g/dL 3.04*  --  2.97* 4.09   BILIRUBIN mg/dL 0.2  --  0.2 0.3   ALK PHOS U/L 68  --  137* 95   AST (SGOT) U/L 15  --  10 12   ALT (SGPT) U/L 15  --  14 18   Estimated Creatinine Clearance: 111.8 mL/min (by C-G formula based on SCr of 0.66 mg/dL).  No results found for: AMMONIA      Blood Culture   Date Value Ref Range Status   03/11/2022 No growth at 24 hours  Preliminary   03/11/2022 No growth at 24 hours  Preliminary     No results found for: URINECX  No results found for: WOUNDCX  No results found for: STOOLCX    Imaging:  Imaging Results (Last 24 Hours)     ** No results found for the last 24 hours. **               Impression:  Diverticulitis with contained perforation.  Patient clinically stable    Plan:  Patient is surgically stable for discharge home on a low residue diet.   Will see back in office in 2 weeks with repeat CT  Oral antibiotics for 2 weeks    Dinora Gomez MD  03/14/22  16:10 EDT      Please note that portions of this note were completed with a voice recognition program.

## 2022-03-14 NOTE — PLAN OF CARE
Problem: Fall Injury Risk  Goal: Absence of Fall and Fall-Related Injury  Outcome: Ongoing, Progressing  Intervention: Promote Injury-Free Environment  Recent Flowsheet Documentation  Taken 3/14/2022 0311 by Demetria Ellis RN  Safety Promotion/Fall Prevention: safety round/check completed  Taken 3/14/2022 0105 by Demetria Ellis RN  Safety Promotion/Fall Prevention: safety round/check completed  Taken 3/13/2022 2300 by Demetria Ellis RN  Safety Promotion/Fall Prevention: safety round/check completed  Taken 3/13/2022 2033 by Demetria Ellis RN  Safety Promotion/Fall Prevention: safety round/check completed     Problem: Adult Inpatient Plan of Care  Goal: Plan of Care Review  Outcome: Ongoing, Progressing  Flowsheets  Taken 3/14/2022 0313 by Demetria Ellis RN  Progress: no change  Outcome Evaluation: Patient appears to be resting well at this time. PRN morphine administered X1. No new complaints, will continue to monitor.  Taken 3/13/2022 1012 by Carri Trevino RN  Plan of Care Reviewed With: patient  Goal: Patient-Specific Goal (Individualized)  Outcome: Ongoing, Progressing  Goal: Absence of Hospital-Acquired Illness or Injury  Outcome: Ongoing, Progressing  Intervention: Identify and Manage Fall Risk  Recent Flowsheet Documentation  Taken 3/14/2022 0311 by Demetria Ellis RN  Safety Promotion/Fall Prevention: safety round/check completed  Taken 3/14/2022 0105 by Demetria Ellis RN  Safety Promotion/Fall Prevention: safety round/check completed  Taken 3/13/2022 2300 by Demetria Ellis RN  Safety Promotion/Fall Prevention: safety round/check completed  Taken 3/13/2022 2033 by Demetria Ellis RN  Safety Promotion/Fall Prevention: safety round/check completed  Intervention: Prevent and Manage VTE (Venous Thromboembolism) Risk  Recent Flowsheet Documentation  Taken 3/13/2022 2033 by Demetria Ellis RN  VTE Prevention/Management: (see MAR)  --  Goal: Optimal Comfort and Wellbeing  Outcome: Ongoing, Progressing  Intervention: Provide Person-Centered Care  Recent Flowsheet Documentation  Taken 3/13/2022 2033 by Demetria Ellis, RN  Trust Relationship/Rapport:   choices provided   care explained   emotional support provided   empathic listening provided   questions answered   questions encouraged   reassurance provided   thoughts/feelings acknowledged  Goal: Readiness for Transition of Care  Outcome: Ongoing, Progressing   Goal Outcome Evaluation:           Progress: no change  Outcome Evaluation: Patient appears to be resting well at this time. PRN morphine administered X1. No new complaints, will continue to monitor.

## 2022-03-14 NOTE — PROGRESS NOTES
[facility]     Patient Identification:  Name:  Nohemi Ospina  Age:  46 y.o.  Sex:  female  :  1975  MRN:  [id number]  Visit Number:  [id number]  Primary Care Provider:  Seble Mena APRN    Length of stay:  3    Subjective: Patient seen and examined assisted by Zahida Brown RN.  Patient is awake and alert, she has 2 bowel movements yesterday which she described as firm, today mixed watery and solids.  No abdominal pain no nausea vomiting no fever tolerating full liquids    Chief Complaint: Abdominal pain  ----------------------------------------------------------------------------------------------------------------------  Current Hospital Meds:  cefTRIAXone, 2 g, Intravenous, Q24H  heparin (porcine), 5,000 Units, Subcutaneous, Q12H  metroNIDAZOLE, 500 mg, Intravenous, Q8H  sodium chloride, 10 mL, Intravenous, Q12H         ----------------------------------------------------------------------------------------------------------------------  Vital Signs:  Temp:  [97.1 °F (36.2 °C)-98.5 °F (36.9 °C)] 97.1 °F (36.2 °C)  Heart Rate:  [75-90] 81  Resp:  [18-20] 20  BP: (121-154)/(53-84) 154/74       Tele:       22  1643   Weight: 89.4 kg (197 lb)     Body mass index is 36.03 kg/m².    Intake/Output Summary (Last 24 hours) at 3/14/2022 1159  Last data filed at 3/14/2022 0400  Gross per 24 hour   Intake 3787.53 ml   Output --   Net 3787.53 ml     Diet Clear Liquid  ----------------------------------------------------------------------------------------------------------------------  Physical exam:  General: Comfortable,awake, alert, oriented to self, place, and time, well-developed and well-nourished.  No respiratory distress.    Skin:  Skin is warm and dry. No rash noted. No pallor.    HENT:  Head:  Normocephalic and atraumatic.  Mouth:  Moist mucous membranes.    Eyes:  Conjunctivae and EOM are normal.  Pupils are equal, round, and reactive to light.  No  scleral icterus.    Neck:  Neck supple.  No JVD present.    Pulmonary/Chest:  No respiratory distress, no wheezes, no crackles, with normal breath sounds and good air movement.  Cardiovascular:  Normal rate, regular rhythm and normal heart sounds with no murmur.  Abdominal:  Soft.  Bowel sounds are normal.  No distension, very minimal tenderness suprapubic area mostly in the left side, no guarding or rigidity tenderness was on deep palpation but very minimal Extremities:  No edema, no tenderness, and no deformity.  No red or swollen joints anywhere.  Strong pulses in all 4 extremities with no clubbing, no cyanosis, no edema.  Neurological:  Motor strength equal no obvious deficit, sensory grossly intact.   No cranial nerve deficit.  No tongue deviation.  No facial droop.  No slurred speech.    Genitourinary: No Oneill catheter  Back:  ----------------------------------------------------------------------------------------------------------------------  ----------------------------------------------------------------------------------------------------------------------  Results from last 7 days   Lab Units 03/12/22 0433   CK TOTAL U/L 35     Results from last 7 days   Lab Units 03/13/22 0310 03/12/22 0433 03/11/22 2121 03/11/22 2012   CRP mg/dL  --   --   --  21.88*   LACTATE mmol/L  --   --  0.7  --    WBC 10*3/mm3 10.03 12.31*  --  15.23*   HEMOGLOBIN g/dL 11.0* 10.8*  --  13.0   HEMATOCRIT % 35.0 34.3  --  40.4   MCV fL 88.4 88.2  --  86.7   MCHC g/dL 31.4* 31.5  --  32.2   PLATELETS 10*3/mm3 308 295  --  356         Results from last 7 days   Lab Units 03/13/22 0310 03/12/22 2036 03/12/22 0433 03/11/22 2012   SODIUM mmol/L 137  --  138 138   POTASSIUM mmol/L 3.7 3.8 3.5 3.3*   MAGNESIUM mg/dL  --   --   --  2.0   CHLORIDE mmol/L 105  --  104 100   CO2 mmol/L 20.1*  --  22.3 25.4   BUN mg/dL 10  --  12 13   CREATININE mg/dL 0.66  --  0.74 0.87   CALCIUM mg/dL 8.4*  --  8.2* 9.3   GLUCOSE mg/dL 89  --   100* 93   ALBUMIN g/dL 3.04*  --  2.97* 4.09   BILIRUBIN mg/dL 0.2  --  0.2 0.3   ALK PHOS U/L 68  --  137* 95   AST (SGOT) U/L 15  --  10 12   ALT (SGPT) U/L 15  --  14 18   Estimated Creatinine Clearance: 111.8 mL/min (by C-G formula based on SCr of 0.66 mg/dL).    No results found for: AMMONIA      Blood Culture   Date Value Ref Range Status   03/11/2022 No growth at 2 days  Preliminary   03/11/2022 No growth at 2 days  Preliminary     No results found for: URINECX  No results found for: WOUNDCX  No results found for: STOOLCX    I have personally looked at the labs and they are summarized above.  ----------------------------------------------------------------------------------------------------------------------  Imaging Results (Last 24 Hours)     ** No results found for the last 24 hours. **        ----------------------------------------------------------------------------------------------------------------------  Assessment and Plan:  -Sepsis present on admission secondary to diverticulitis  -Acute diverticulitis with possible localized microperforation on CAT scan, repeat CT no significant change from previous  -Obesity with a BMI of 36  -Hypertension even without pain    Will likely advance diet today care of surgery's discretion, patient's white count is trending down, will repeat CRP.  Monitor electrolytes, monitor abdominal status, hopefully home tomorrow.  With regards to her blood pressure will monitor if persistent may start antihypertensive.    Disposition Home when stable      Roxie Fitch MD  03/14/22  11:59 EDT

## 2022-03-14 NOTE — CASE MANAGEMENT/SOCIAL WORK
Discharge Planning Assessment   [place]     Patient Name: Nohemi Ospina  MRN: [id number]  Today's Date: 3/14/2022    Admit Date: 3/11/2022     Discharge Needs Assessment     Row Name 03/14/22 1139       Living Environment    People in Home spouse;child(libia), dependent    Name(s) of People in Home Pt lives at home with spouse and 3 children and plans to return home at d/c.    Current Living Arrangements home    Primary Care Provided by self    Provides Primary Care For child(libia)    Family Caregiver if Needed spouse    Family Caregiver Names Spouse Arslan [phone]    Quality of Family Relationships unable to assess    Able to Return to Prior Arrangements yes       Resource/Environmental Concerns    Resource/Environmental Concerns none    Transportation Concerns none       Transition Planning    Patient/Family Anticipates Transition to home    Patient/Family Anticipated Services at Transition none    Transportation Anticipated family or friend will provide       Discharge Needs Assessment    Readmission Within the Last 30 Days no previous admission in last 30 days    Equipment Currently Used at Home none    Concerns to be Addressed no discharge needs identified;denies needs/concerns at this time    Anticipated Changes Related to Illness none    Equipment Needed After Discharge none    Patient's Choice of Community Agency(s) Pt does not have HH and denies any needs.               Discharge Plan     Row Name 03/14/22 2087       Plan    Plan CM spoke with pt via telephone. Pt lives at home in [facility]  with spouse and 3 children and plans to return home at d/c. Pt does not have DME/HH and denies any needs. PCP is Seble GRAYSON and she gets rx filled at [facility]. Pt has [employer] [place] insurance and denies any issues with copays. Pt is independent with her care at home. Pt's spouse will transport at d/c. CM will follow.    Patient/Family in Agreement with Plan yes    Plan Comments Sepsis, diverticulitis with  localized perforation. 3/14: Admit [facility] 3/11, c/o abd pain, NPO, IVF's then d/c, Rocephin and Flagyl IV, Surgery consult, accu cks, KCL repl, repeat CT no change added clear liquids, morphine IV prn, BM x2. VSS, afeb, bld cx neg so far.              Expected Discharge Date and Time     Expected Discharge Date Expected Discharge Time    Mar 15, 2022         Sudha Acosta RN

## 2022-03-14 NOTE — PLAN OF CARE
Goal Outcome Evaluation:     Patient doing well today, ambulating in hallway.  Diet advanced to regular low residue; patient tolerating well.  Anticipated discharge in the morning.  No other issues noted.

## 2022-03-15 VITALS
RESPIRATION RATE: 18 BRPM | DIASTOLIC BLOOD PRESSURE: 89 MMHG | OXYGEN SATURATION: 96 % | HEIGHT: 62 IN | TEMPERATURE: 97.9 F | WEIGHT: 197 LBS | BODY MASS INDEX: 36.25 KG/M2 | HEART RATE: 86 BPM | SYSTOLIC BLOOD PRESSURE: 149 MMHG

## 2022-03-15 DIAGNOSIS — K57.92 DIVERTICULITIS: Primary | ICD-10-CM

## 2022-03-15 LAB
BASOPHILS # BLD AUTO: 0.07 10*3/MM3 (ref 0–0.2)
BASOPHILS NFR BLD AUTO: 0.6 % (ref 0–1.5)
CRP SERPL-MCNC: 3.7 MG/DL (ref 0–0.5)
DEPRECATED RDW RBC AUTO: 38.9 FL (ref 37–54)
EOSINOPHIL # BLD AUTO: 0.47 10*3/MM3 (ref 0–0.4)
EOSINOPHIL NFR BLD AUTO: 3.7 % (ref 0.3–6.2)
ERYTHROCYTE [DISTWIDTH] IN BLOOD BY AUTOMATED COUNT: 12.7 % (ref 12.3–15.4)
HCT VFR BLD AUTO: 35.8 % (ref 34–46.6)
HGB BLD-MCNC: 11.7 G/DL (ref 12–15.9)
IMM GRANULOCYTES # BLD AUTO: 0.11 10*3/MM3 (ref 0–0.05)
IMM GRANULOCYTES NFR BLD AUTO: 0.9 % (ref 0–0.5)
LYMPHOCYTES # BLD AUTO: 2.98 10*3/MM3 (ref 0.7–3.1)
LYMPHOCYTES NFR BLD AUTO: 23.6 % (ref 19.6–45.3)
MCH RBC QN AUTO: 27.9 PG (ref 26.6–33)
MCHC RBC AUTO-ENTMCNC: 32.7 G/DL (ref 31.5–35.7)
MCV RBC AUTO: 85.4 FL (ref 79–97)
MONOCYTES # BLD AUTO: 0.83 10*3/MM3 (ref 0.1–0.9)
MONOCYTES NFR BLD AUTO: 6.6 % (ref 5–12)
NEUTROPHILS NFR BLD AUTO: 64.6 % (ref 42.7–76)
NEUTROPHILS NFR BLD AUTO: 8.19 10*3/MM3 (ref 1.7–7)
NRBC BLD AUTO-RTO: 0 /100 WBC (ref 0–0.2)
PLATELET # BLD AUTO: 356 10*3/MM3 (ref 140–450)
PMV BLD AUTO: 10.3 FL (ref 6–12)
RBC # BLD AUTO: 4.19 10*6/MM3 (ref 3.77–5.28)
WBC NRBC COR # BLD: 12.65 10*3/MM3 (ref 3.4–10.8)

## 2022-03-15 PROCEDURE — 25010000002 HEPARIN (PORCINE) PER 1000 UNITS: Performed by: INTERNAL MEDICINE

## 2022-03-15 PROCEDURE — 99239 HOSP IP/OBS DSCHRG MGMT >30: CPT | Performed by: HOSPITALIST

## 2022-03-15 PROCEDURE — 85025 COMPLETE CBC W/AUTO DIFF WBC: CPT | Performed by: HOSPITALIST

## 2022-03-15 PROCEDURE — 86140 C-REACTIVE PROTEIN: CPT | Performed by: HOSPITALIST

## 2022-03-15 RX ORDER — METRONIDAZOLE 500 MG/1
500 TABLET ORAL 3 TIMES DAILY
Qty: 42 TABLET | Refills: 0 | Status: SHIPPED | OUTPATIENT
Start: 2022-03-15 | End: 2022-03-29

## 2022-03-15 RX ORDER — CEFDINIR 300 MG/1
300 CAPSULE ORAL 2 TIMES DAILY
Qty: 28 CAPSULE | Refills: 0 | Status: SHIPPED | OUTPATIENT
Start: 2022-03-15 | End: 2022-03-29

## 2022-03-15 RX ADMIN — METRONIDAZOLE 500 MG: 500 INJECTION, SOLUTION INTRAVENOUS at 05:09

## 2022-03-15 RX ADMIN — HEPARIN SODIUM 5000 UNITS: 5000 INJECTION INTRAVENOUS; SUBCUTANEOUS at 08:02

## 2022-03-15 RX ADMIN — Medication 10 ML: at 08:02

## 2022-03-15 NOTE — PAYOR COMM NOTE
"CONTACT:   Velia Trujillo RN  Phone: [phone]  Fax: [phone]    DISCHARGE NOTIFICATION    DISCHARGE TO: HOME/SELF CARE    REF # [id number]  DC DATE: 3/15/22    IF YOU NEED ANYTHING FURTHER PLEASE LET ME KNOW.   THANKS     Luda Ospina (46 y.o. Female)             Date of Birth   1975    Social Security Number       Address   [address] [place] [address]    Home Phone   [phone]    MRN   [id number]       Bahai   None    Marital Status                               Admission Date   3/11/22    Admission Type   Emergency    Admitting Provider   Carla Reid DO    Attending Provider       Department, Room/Bed   [address] 3340/       Discharge Date   3/15/2022    Discharge Disposition   Home or Self Care    Discharge Destination                               Attending Provider: (none)   Allergies: Naproxen, Penicillins    Isolation: None   Infection: None   Code Status: CPR   Advance Care Planning Activity    Ht: 157.5 cm (62\")   Wt: 89.4 kg (197 lb)    Admission Cmt: None   Principal Problem: Sepsis without acute organ dysfunction (HCC) [A41.9]                 Active Insurance as of 3/11/2022     Primary Coverage     Payor Plan Insurance Group Employer/Plan Group    [employer] [place] [employer]      Payor Plan Address Payor Plan Phone Number Payor Plan Fax Number Effective Dates    [address] [address] [phone]  11/27/2018 - None Entered    [facility] [id number]       Subscriber Name Subscriber Birth Date Member ID       LUDA OSPINA 1975 [id number]                 Emergency Contacts      (Rel.) Home Phone Work Phone Mobile Phone    BhaveshArslan (Spouse) [phone] -- --    NEPTALI BARAJAS (Daughter) -- -- [phone]               Discharge Summary      Roxie Fitch MD at 03/15/22 [address] [place] [address] HOSPITALIST MEDICINE DISCHARGE SUMMARY    Patient Identification:  Name:  Luda Ospina  Age:  46 " y.o.  Sex:  female  :  1975  MRN:  [id number]  Visit Number:  [id number]    Date of Admission: 3/11/2022  Date of Discharge: March 15, 2020 2010  DISCHARGE DISPOSITION   Stable  PCP: Seble Mena APRN    DISCHARGE DIAGNOSIS : Sepsis present on admission secondary to acute diverticulitis with localized perforation noted on CAT scan, acute hypokalemia, hypertension patient preferred to follow-up with primary provider for monitoring and possible initiation with treatment, history of sciatica left lower extremity, small umbilical hernia, obesity with BMI of 36.  Dyslipidemia.    HOSPITAL COURSE  Patient is a 46 y.o. female presented to [facility] complaining of 2 days of left lower quadrant pain associate with cramping diarrhea nausea at the emergency room CRP was elevated, CT scan of the abdomen pelvis revealed segmental diverticulitis of the lower descending colon with findings suggestive of local perforations, no drainable abscess.  Surgery was consulted and recommended patient be admitted for IV antibiotic and close monitoring of abdomen.  Electrolytes was replaced including hypokalemia.  During her stay, her blood pressure slightly elevated, even when the patient longer has the blood.  Subsequent follow-up CAT scan of the abdomen pelvis no significant change of findings symptoms has resolved, she has bowel movement yesterday today which is normal.  No abdominal pain she was challenged with regular diet without any complaints.  Surgery has cleared the patient for discharge and continue oral antibiotic for total of 14 days, follow-up with Dr. Patricia reynolds.  Discussed with patient regarding her blood pressure, she prefers to follow-up with her primary provider and discuss options.  Please note this discussion was done in the presence of Zahida Brown RN.    Patient was also counseled to come to the emergency room or call for medical help should she develop nausea vomiting  hematochezia fever chills or abdominal pain for possible recurrence of diverticulitis.    VITAL SIGNS:      03/11/22  1643   Weight: 89.4 kg (197 lb)     Body mass index is 36.03 kg/m².  Vitals:    03/15/22 0700   BP: 149/89   Pulse: 86   Resp: 18   Temp: 97.9 °F (36.6 °C)   SpO2: 96%     PHYSICAL EXAM:  General: Comfortable,awake, alert, oriented to self, place, and time, well-developed and well-nourished.  No respiratory distress.    Skin:  Skin is warm and dry. No rash noted. No pallor.    HENT:  Head:  Normocephalic and atraumatic.  Mouth:  Moist mucous membranes.    Eyes:  Conjunctivae and EOM are normal.  Pupils are equal, round, and reactive to light.  No scleral icterus.    Neck:  Neck supple.  No JVD present.    Pulmonary/Chest:  No respiratory distress, no wheezes, no crackles, with normal breath sounds and good air movement.  Cardiovascular:  Normal rate, regular rhythm and normal heart sounds with no murmur.  Abdominal: Obese soft.  Bowel sounds are normal.  No distension and no tenderness.   Extremities:  No edema, no tenderness, and no deformity.  No red or swollen joints anywhere.  Strong pulses in all 4 extremities with no clubbing, no cyanosis, no edema.  Neurological:  Motor strength equal no obvious deficit, sensory grossly intact.   No cranial nerve deficit.  No tongue deviation.  No facial droop.  No slurred speech.    Genitourinary: No Oneill cath  Back:  ----------    DISCHARGE MEDICATIONS:     Discharge Medications      New Medications      Instructions Start Date   cefdinir 300 MG capsule  Commonly known as: OMNICEF   300 mg, Oral, 2 Times Daily      metroNIDAZOLE 500 MG tablet  Commonly known as: Flagyl   500 mg, Oral, 3 Times Daily                   Additional Instructions for the Follow-ups that You Need to Schedule     Discharge Follow-up with PCP   As directed       Currently Documented PCP:    Seble Mena APRN    PCP Phone Number:    None     Follow Up Details: KENAN Mena  (1 week posthospitalization follow-up with BP check)         Discharge Follow-up with Specified Provider: Dr. Gomez; 2 Weeks   As directed      To: Dr. Gomez    Follow Up: 2 Weeks            Follow-up Information     Seble Mena APRN .    Specialty: Family Medicine  Why: KENAN Mena (1 week posthospitalization follow-up with BP check)  Contact information:  [address] [facility] [address]  [address]  [facility] [facility] [address]  [phone]                         Roxie Fitch MD  03/15/22  10:28 EDT    Please note that this discharge summary required more than 30 minutes to complete.      Electronically signed by Roxie Ficth MD at 03/15/22 9798

## 2022-03-15 NOTE — CASE MANAGEMENT/SOCIAL WORK
Continued Stay Note  JOCELYNN Wood     Patient Name: Nohemi Ospina  MRN: [id number]  Today's Date: 3/15/2022    Admit Date: 3/11/2022     Discharge Plan     Row Name 03/15/22 1044       Plan    Final Discharge Disposition Code 01 - home or self-care    Final Note Pt is being d/c home. Encouraged pt to use 24 hr RN call line with any questions after d/c, take all medications as prescribed, continue low residue diet, and f/u as directed. No further needs identified.              Sudha Acosta RN

## 2022-03-15 NOTE — DISCHARGE SUMMARY
[facility] HOSPITALIST MEDICINE DISCHARGE SUMMARY    Patient Identification:  Name:  Nohemi Ospina  Age:  46 y.o.  Sex:  female  :  1975  MRN:  [id number]  Visit Number:  [id number]    Date of Admission: 3/11/2022  Date of Discharge: March 15, 2020 2010  DISCHARGE DISPOSITION   Stable  PCP: Seble Mena, KENAN    DISCHARGE DIAGNOSIS : Sepsis present on admission secondary to acute diverticulitis with localized perforation noted on CAT scan, acute hypokalemia, hypertension patient preferred to follow-up with primary provider for monitoring and possible initiation with treatment, history of sciatica left lower extremity, small umbilical hernia, obesity with BMI of 36.  Dyslipidemia.    HOSPITAL COURSE  Patient is a 46 y.o. female presented to [facility] complaining of 2 days of left lower quadrant pain associate with cramping diarrhea nausea at the emergency room CRP was elevated, CT scan of the abdomen pelvis revealed segmental diverticulitis of the lower descending colon with findings suggestive of local perforations, no drainable abscess.  Surgery was consulted and recommended patient be admitted for IV antibiotic and close monitoring of abdomen.  Electrolytes was replaced including hypokalemia.  During her stay, her blood pressure slightly elevated, even when the patient longer has the blood.  Subsequent follow-up CAT scan of the abdomen pelvis no significant change of findings symptoms has resolved, she has bowel movement yesterday today which is normal.  No abdominal pain she was challenged with regular diet without any complaints.  Surgery has cleared the patient for discharge and continue oral antibiotic for total of 14 days, follow-up with Dr. Patricia reynolds.  Discussed with patient regarding her blood pressure, she prefers to follow-up with her primary provider and discuss options.  Please note this discussion was done in the presence of Zahida rBown  RN.    Patient was also counseled to come to the emergency room or call for medical help should she develop nausea vomiting hematochezia fever chills or abdominal pain for possible recurrence of diverticulitis.    VITAL SIGNS:      03/11/22  1643   Weight: 89.4 kg (197 lb)     Body mass index is 36.03 kg/m².  Vitals:    03/15/22 0700   BP: 149/89   Pulse: 86   Resp: 18   Temp: 97.9 °F (36.6 °C)   SpO2: 96%     PHYSICAL EXAM:  General: Comfortable,awake, alert, oriented to self, place, and time, well-developed and well-nourished.  No respiratory distress.    Skin:  Skin is warm and dry. No rash noted. No pallor.    HENT:  Head:  Normocephalic and atraumatic.  Mouth:  Moist mucous membranes.    Eyes:  Conjunctivae and EOM are normal.  Pupils are equal, round, and reactive to light.  No scleral icterus.    Neck:  Neck supple.  No JVD present.    Pulmonary/Chest:  No respiratory distress, no wheezes, no crackles, with normal breath sounds and good air movement.  Cardiovascular:  Normal rate, regular rhythm and normal heart sounds with no murmur.  Abdominal: Obese soft.  Bowel sounds are normal.  No distension and no tenderness.   Extremities:  No edema, no tenderness, and no deformity.  No red or swollen joints anywhere.  Strong pulses in all 4 extremities with no clubbing, no cyanosis, no edema.  Neurological:  Motor strength equal no obvious deficit, sensory grossly intact.   No cranial nerve deficit.  No tongue deviation.  No facial droop.  No slurred speech.    Genitourinary: No Oneill cath  Back:  ----------    DISCHARGE MEDICATIONS:     Discharge Medications      New Medications      Instructions Start Date   cefdinir 300 MG capsule  Commonly known as: OMNICEF   300 mg, Oral, 2 Times Daily      metroNIDAZOLE 500 MG tablet  Commonly known as: Flagyl   500 mg, Oral, 3 Times Daily                   Additional Instructions for the Follow-ups that You Need to Schedule     Discharge Follow-up with PCP   As directed        Currently Documented PCP:    Seble Mena APRN    PCP Phone Number:    None     Follow Up Details: KENAN Mena (1 week posthospitalization follow-up with BP check)         Discharge Follow-up with Specified Provider: Dr. Gomez; 2 Weeks   As directed      To: Dr. Gomez    Follow Up: 2 Weeks            Follow-up Information     Seble Mena APRN .    Specialty: Family Medicine  Why: KENAN Mena (1 week posthospitalization follow-up with BP check)  Contact information:  [address] [place], [place] [address]  [facility] [address]  [phone]                         Roxie Fitch MD  03/15/22  10:28 EDT    Please note that this discharge summary required more than 30 minutes to complete.

## 2022-03-15 NOTE — PLAN OF CARE
Goal Outcome Evaluation:  Plan of Care Reviewed With: patient           Outcome Evaluation: Pt received one dose of morphine overnight, no more pain reported. Rested well. Tolerated diet with no nausea/vomitting/etc.

## 2022-03-16 ENCOUNTER — READMISSION MANAGEMENT (OUTPATIENT)
Dept: CALL CENTER | Facility: HOSPITAL | Age: 47
End: 2022-03-16

## 2022-03-16 LAB
BACTERIA SPEC AEROBE CULT: NORMAL
BACTERIA SPEC AEROBE CULT: NORMAL

## 2022-03-16 NOTE — OUTREACH NOTE
Prep Survey    Flowsheet Row Responses   [facility] facility patient discharged from? [facility]   Is LACE score < 7 ? No   Emergency Room discharge w/ pulse ox? No   Eligibility Readm Mgmt   Discharge diagnosis sepsis secondary to acute diverticulitis    Does the patient have one of the following disease processes/diagnoses(primary or secondary)? Sepsis   Does the patient have Home health ordered? No   Is there a DME ordered? No   Prep survey completed? Yes          HAYES SANDERS - Registered Nurse

## 2022-03-21 ENCOUNTER — OFFICE VISIT (OUTPATIENT)
Dept: FAMILY MEDICINE CLINIC | Facility: CLINIC | Age: 47
End: 2022-03-21

## 2022-03-21 VITALS
BODY MASS INDEX: 35.19 KG/M2 | SYSTOLIC BLOOD PRESSURE: 134 MMHG | HEART RATE: 89 BPM | DIASTOLIC BLOOD PRESSURE: 70 MMHG | HEIGHT: 62 IN | WEIGHT: 191.2 LBS | OXYGEN SATURATION: 99 % | TEMPERATURE: 97.1 F

## 2022-03-21 DIAGNOSIS — E66.01 MORBIDLY OBESE: ICD-10-CM

## 2022-03-21 DIAGNOSIS — K57.92 DIVERTICULITIS: Primary | ICD-10-CM

## 2022-03-21 DIAGNOSIS — Z12.31 ENCOUNTER FOR SCREENING MAMMOGRAM FOR MALIGNANT NEOPLASM OF BREAST: ICD-10-CM

## 2022-03-21 PROCEDURE — 99214 OFFICE O/P EST MOD 30 MIN: CPT | Performed by: FAMILY MEDICINE

## 2022-03-21 NOTE — PROGRESS NOTES
Transitional Care Follow Up Visit  Subjective     Nohemi Ospina is a 46 y.o. female who presents for a transitional care management visit.    Within 48 business hours after discharge our office contacted her via telephone to coordinate her care and needs.      I reviewed and discussed the details of that call along with the discharge summary, hospital problems, inpatient lab results, inpatient diagnostic studies, and consultation reports with Nohemi.     Current outpatient and discharge medications have been reconciled for the patient.  Reviewed by: KENAN Zafar      No flowsheet data found.  Risk for Readmission (LACE) Score: 7 (3/15/2022  6:01 AM)      History of Present Illness   Course During Hospital Stay: Patient was admitted [facility] 3/11/2022 and discharged 3/15/2022.  Presented to the ED complaining of 2 days of left lower quadrant pain associated with cramping, diarrhea, nausea.  ERP was elevated, CT scan abdomen pelvis revealed segmental diverticulitis of the lower descending colon with findings suggestive of local perforations, no drainable abscess.  Surgery was consulted and recommended to be admitted for IV antibiotics and close monitoring of abdomen.  Potassium was replaced during this time.  During stay blood pressure was slightly elevated.  Subsequent follow-up of CT scan of abdomen pelvis had no significant change of finding symptoms have resolved.  She was having normal bowel movements.  Tolerating regular diet well.  Patient was discharged home to continue oral antibiotics for total of 14 days and follow-up with Dr. Gomez outpatient.    Patient presents to the clinic today stating her symptoms have resolved.  States she does still have some intermitting abdominal cramping at times.  States she has not completed her antibiotics she still has a few days left.  States she follows up with Dr. Espinoza on 3/31.  States she had elevated blood pressure in the hospital however she has  not had any issues with her blood pressure before.  Pressure today was in normal limits.  We will have patient monitor BP at home follow-up in 2 weeks.  She has not had any routine lab work since 2020 patient will return for fasting labs.  Patient states she is past due for mammogram we will schedule this call patient with appointment.  States she has had a Pap in the past 3 years she gets these done at [facility].  She has not had a colonoscopy however she does have a family history of colon cancer.  Discussed the importance of patient getting colonoscopy.  States she will discuss with Dr. Gomez when she sees her on 3/31.    The following portions of the patient's history were reviewed and updated as appropriate: allergies, current medications, past family history, past medical history, past social history, past surgical history and problem list.     Review of Systems    Objective   Physical Exam  Constitutional:       General: She is not in acute distress.     Appearance: Normal appearance. She is well-developed and well-groomed. She is not ill-appearing, toxic-appearing or diaphoretic.   HENT:      Head: Normocephalic.      Nose: Nose normal. No congestion or rhinorrhea.      Mouth/Throat:      Mouth: Mucous membranes are moist.      Pharynx: Oropharynx is clear. No oropharyngeal exudate or posterior oropharyngeal erythema.   Eyes:      General: Lids are normal.         Right eye: No discharge.         Left eye: No discharge.      Extraocular Movements: Extraocular movements intact.      Pupils: Pupils are equal, round, and reactive to light.   Neck:      Vascular: No carotid bruit.   Cardiovascular:      Rate and Rhythm: Normal rate and regular rhythm.      Pulses: Normal pulses.      Heart sounds: Normal heart sounds. No murmur heard.    No friction rub. No gallop.   Pulmonary:      Effort: Pulmonary effort is normal. No respiratory distress.      Breath sounds: Normal breath sounds. No stridor. No wheezing,  rhonchi or rales.   Chest:      Chest wall: No tenderness.   Abdominal:      General: Bowel sounds are normal. There is no distension.      Palpations: Abdomen is soft. There is no mass.      Tenderness: There is no abdominal tenderness. There is no right CVA tenderness, left CVA tenderness, guarding or rebound.      Hernia: No hernia is present.   Musculoskeletal:         General: No swelling or tenderness. Normal range of motion.      Cervical back: Normal range of motion and neck supple. No rigidity or tenderness.      Right lower leg: No edema.      Left lower leg: No edema.   Lymphadenopathy:      Cervical: No cervical adenopathy.   Skin:     General: Skin is warm.      Capillary Refill: Capillary refill takes less than 2 seconds.      Coloration: Skin is not jaundiced.      Findings: No bruising, erythema or rash.   Neurological:      General: No focal deficit present.      Mental Status: She is alert and oriented to person, place, and time.      Motor: Motor function is intact. No weakness.      Coordination: Coordination is intact.      Gait: Gait is intact. Gait normal.   Psychiatric:         Attention and Perception: Attention normal.         Mood and Affect: Mood normal.         Speech: Speech normal.         Behavior: Behavior normal.         Cognition and Memory: Cognition normal.         Judgment: Judgment normal.         Assessment/Plan   Diagnoses and all orders for this visit:    1. Diverticulitis (Primary)    2. Morbidly obese (HCC)  -     CBC Auto Differential; Future  -     Comprehensive Metabolic Panel; Future  -     Hemoglobin A1c; Future  -     Lipid Panel; Future  -     TSH; Future  -     Vitamin D 25 Hydroxy; Future    3. Encounter for screening mammogram for malignant neoplasm of breast  -     Mammo Screening Bilateral With CAD; Future               Procedures     Return in about 2 weeks (around 4/4/2022).

## 2022-03-22 ENCOUNTER — READMISSION MANAGEMENT (OUTPATIENT)
Dept: CALL CENTER | Facility: HOSPITAL | Age: 47
End: 2022-03-22

## 2022-03-22 NOTE — OUTREACH NOTE
Medical Week 1 Survey    Flowsheet Row Responses   [facility] patient discharged from? [place]   Does the patient have one of the following disease processes/diagnoses(primary or secondary)? Sepsis   Call start time 1353   Call end time 1520   Discharge diagnosis sepsis secondary to acute diverticulitis    Person spoke with today (if not patient) and relationship patient   Meds reviewed with patient/caregiver? Yes   Is the patient having any side effects they believe may be caused by any medication additions or changes? No   Is the patient taking all medications as directed (includes completed medication regime)? Yes   Does the patient have a primary care provider?  Yes   Does the patient have an appointment with their PCP within 7 days of discharge? Yes   Comments regarding PCP Pt saw PCP yesterday   Has the patient kept scheduled appointments due by today? Yes   Comments Pt has CT with contrast on March 30th   Psychosocial issues? No   Did the patient receive a copy of their discharge instructions? Yes   Nursing interventions Reviewed instructions with patient   What is the patient's perception of their health status since discharge? Improving   Is the patient/caregiver able to teach back the hierarchy of who to call/visit for symptoms/problems? PCP, Specialist, Home health nurse, Urgent Care, ED, 911 Yes   If the patient is a current smoker, are they able to teach back resources for cessation? Not a smoker   Wrap up additional comments Pt reports she is doing well today.  She denies needs.          SAMANTHA MCARTHUR - Registered Nurse

## 2022-03-30 ENCOUNTER — READMISSION MANAGEMENT (OUTPATIENT)
Dept: CALL CENTER | Facility: HOSPITAL | Age: 47
End: 2022-03-30

## 2022-03-30 ENCOUNTER — HOSPITAL ENCOUNTER (OUTPATIENT)
Dept: CT IMAGING | Facility: HOSPITAL | Age: 47
Discharge: HOME | End: 2022-03-30
Admitting: SURGERY

## 2022-03-30 DIAGNOSIS — K57.92 DIVERTICULITIS: ICD-10-CM

## 2022-03-30 PROCEDURE — 74177 CT ABD & PELVIS W/CONTRAST: CPT

## 2022-03-30 PROCEDURE — 25010000002 IOPAMIDOL 61 % SOLUTION: Performed by: SURGERY

## 2022-03-30 PROCEDURE — 74177 CT ABD & PELVIS W/CONTRAST: CPT | Performed by: RADIOLOGY

## 2022-03-30 RX ADMIN — IOPAMIDOL 100 ML: 612 INJECTION, SOLUTION INTRAVENOUS at 11:03

## 2022-03-30 NOTE — OUTREACH NOTE
Sepsis Week 2 Survey    Flowsheet Row Responses   [facility] patient discharged from? [place]   Does the patient have one of the following disease processes/diagnoses(primary or secondary)? Sepsis   Week 2 attempt successful? Yes   Call start time 1714   Call end time 1716   Discharge diagnosis sepsis secondary to acute diverticulitis    Meds reviewed with patient/caregiver? Yes   Is the patient having any side effects they believe may be caused by any medication additions or changes? No   Does the patient have all medications related to this admission filled (includes all antibiotics, inhalers, nebulizers,steroids,etc.) Yes   Is the patient taking all medications as directed (includes completed medication regime)? Yes   Does the patient have a primary care provider?  Yes   Does the patient have an appointment with their PCP within 7 days of discharge? Yes   Has the patient kept scheduled appointments due by today? Yes   Comments Had CT Scan   Has home health visited the patient within 72 hours of discharge? N/A   Psychosocial issues? No   Did the patient receive a copy of their discharge instructions? Yes   Nursing interventions Reviewed instructions with patient   What is the patient's perception of their health status since discharge? Improving   Nursing interventions Nurse provided patient education   Is the patient/caregiver able to teach back Sepsis? S - Shivering,fever or very cold, P - Pale or discolored skin, E - Extreme pain or generalized discomfort (worst ever,especially abdomen), S - Short of breath, S - Sleepy, difficult to arouse,confused, I -   I feel like I might die-a feeling of hopelessness   Nursing interventions Nurse provided reassurance to patient   Is patient/caregiver able to teach back steps to recovery at home? Set small, achievable goals for return to baseline health, Rest and regain strength, Talk about feelings with family/friends, Eat a balanced diet, Make a list of questions for  PCP appoinment   Is the patient/caregiver able to teach back signs and symptoms of worsening condition: Fever, Edema, Shortness of breath/rapid respiratory rate   If the patient is a current smoker, are they able to teach back resources for cessation? Not a smoker   Is the patient/caregiver able to teach back the hierarchy of who to call/visit for symptoms/problems? PCP, Specialist, Home health nurse, Urgent Care, ED, 911 Yes   Additional teach back comments She is doing better she says and has an appt with surgeon tomorrow.   Wrap up additional comments Denies  sharp pains like before. She is improving.          VALENTINE JOYNER - Registered Nurse

## 2022-03-31 ENCOUNTER — LAB (OUTPATIENT)
Dept: FAMILY MEDICINE CLINIC | Facility: CLINIC | Age: 47
End: 2022-03-31

## 2022-03-31 ENCOUNTER — OFFICE VISIT (OUTPATIENT)
Dept: SURGERY | Facility: CLINIC | Age: 47
End: 2022-03-31

## 2022-03-31 VITALS
BODY MASS INDEX: 34.82 KG/M2 | HEART RATE: 74 BPM | SYSTOLIC BLOOD PRESSURE: 142 MMHG | WEIGHT: 189.2 LBS | DIASTOLIC BLOOD PRESSURE: 86 MMHG | HEIGHT: 62 IN

## 2022-03-31 DIAGNOSIS — K57.92 DIVERTICULITIS: Primary | ICD-10-CM

## 2022-03-31 DIAGNOSIS — E66.01 MORBIDLY OBESE: ICD-10-CM

## 2022-03-31 PROCEDURE — 80061 LIPID PANEL: CPT | Performed by: FAMILY MEDICINE

## 2022-03-31 PROCEDURE — 99213 OFFICE O/P EST LOW 20 MIN: CPT | Performed by: SURGERY

## 2022-03-31 PROCEDURE — 82306 VITAMIN D 25 HYDROXY: CPT | Performed by: FAMILY MEDICINE

## 2022-03-31 PROCEDURE — 80050 GENERAL HEALTH PANEL: CPT | Performed by: FAMILY MEDICINE

## 2022-03-31 PROCEDURE — 83036 HEMOGLOBIN GLYCOSYLATED A1C: CPT | Performed by: FAMILY MEDICINE

## 2022-03-31 NOTE — PROGRESS NOTES
"Subjective   Nohemi Ospina is a 46 y.o. female here today for hospital follow up.    History of Present Illness  Ms. Ospina was seen in the office today for follow-up following her recent hospitalization for diverticulitis with contained perforation.  She completed her antibiotics yesterday.  She had a follow-up CT with contrast which demonstrated some mild inflammation but resolution of the extraluminal air.  Patient states her bowels are working well.  She does have some lower abdominal discomfort but she is not sure if this is due to her enlarged uterus or not.  No fever or chills.  Allergies   Allergen Reactions   • Naproxen Rash   • Penicillins Rash     No current outpatient medications on file.     No current facility-administered medications for this visit.     Past Medical History:   Diagnosis Date   • Kidney stones      Past Surgical History:   Procedure Laterality Date   •  SECTION     • TUBAL ABDOMINAL LIGATION         Pertinent Review of Systems:  Respiratory: no shortness of breath  Cardiovascular: no chest pain  Other pertinent:      Objective   /86 (BP Location: Left arm)   Pulse 74   Ht 157.5 cm (62\")   Wt 85.8 kg (189 lb 3.2 oz)   BMI 34.61 kg/m²   Physical Exam  Well-developed well-nourished female no acute distress  Abdomen: Abdomen is nondistended.  Bowel sounds are normal.  Patient has some very mild tenderness to the deep palpation left lateral abdomen.  This is actually not the size of the patient's contained perforation which is closer to the midline.  No palpable mass  Procedures     Results/Data:  Imaging: CT reports and images were reviewed.  Size of uterus was not commented on but it is enlarged.  Otherwise I agree with the assessment  Notes:   Lab:   Other:     Assessment/Plan   Diverticulitis with contained perforation, markedly improved with antibiotics    Plan: Patient is aware to call immediately if symptoms recur  Will refer patient to Dr. Pizarro for post " diverticulitis colonoscopy  Follow-up with me as needed         Discussion/Summary    Time spent:     Patient's Body mass index is 34.61 kg/m². indicating that she is obese (BMI >30). Obesity-related health conditions include the following: none. Obesity is improving with lifestyle modifications. BMI is is above average; BMI management plan is completed. We discussed portion control and increasing exercise..       Future Appointments   Date Time Provider Department Center   3/31/2022  9:00 AM Dinora Gomez MD [facility] [place] [facility]   4/5/2022  4:30 PM Sharyn Storm APRN [facility]   4/14/2022  4:20 PM [facility]         Please note that portions of this note were completed with a voice recognition program.

## 2022-04-01 LAB
25(OH)D3 SERPL-MCNC: 13.6 NG/ML (ref 30–100)
ALBUMIN SERPL-MCNC: 3.9 G/DL (ref 3.5–5.2)
ALBUMIN/GLOB SERPL: 1.1 G/DL
ALP SERPL-CCNC: 63 U/L (ref 39–117)
ALT SERPL W P-5'-P-CCNC: 22 U/L (ref 1–33)
ANION GAP SERPL CALCULATED.3IONS-SCNC: 11.6 MMOL/L (ref 5–15)
AST SERPL-CCNC: 15 U/L (ref 1–32)
BASOPHILS # BLD AUTO: 0.17 10*3/MM3 (ref 0–0.2)
BASOPHILS NFR BLD AUTO: 2 % (ref 0–1.5)
BILIRUB SERPL-MCNC: 0.2 MG/DL (ref 0–1.2)
BUN SERPL-MCNC: 10 MG/DL (ref 6–20)
BUN/CREAT SERPL: 12.7 (ref 7–25)
CALCIUM SPEC-SCNC: 9.4 MG/DL (ref 8.6–10.5)
CHLORIDE SERPL-SCNC: 102 MMOL/L (ref 98–107)
CHOLEST SERPL-MCNC: 200 MG/DL (ref 0–200)
CO2 SERPL-SCNC: 25.4 MMOL/L (ref 22–29)
CREAT SERPL-MCNC: 0.79 MG/DL (ref 0.57–1)
DEPRECATED RDW RBC AUTO: 38.5 FL (ref 37–54)
EGFRCR SERPLBLD CKD-EPI 2021: 94.1 ML/MIN/1.73
EOSINOPHIL # BLD AUTO: 0.38 10*3/MM3 (ref 0–0.4)
EOSINOPHIL NFR BLD AUTO: 4.6 % (ref 0.3–6.2)
ERYTHROCYTE [DISTWIDTH] IN BLOOD BY AUTOMATED COUNT: 12.9 % (ref 12.3–15.4)
GLOBULIN UR ELPH-MCNC: 3.5 GM/DL
GLUCOSE SERPL-MCNC: 88 MG/DL (ref 65–99)
HBA1C MFR BLD: 5.7 % (ref 4.8–5.6)
HCT VFR BLD AUTO: 39.4 % (ref 34–46.6)
HDLC SERPL-MCNC: 40 MG/DL (ref 40–60)
HGB BLD-MCNC: 13.4 G/DL (ref 12–15.9)
IMM GRANULOCYTES # BLD AUTO: 0.03 10*3/MM3 (ref 0–0.05)
IMM GRANULOCYTES NFR BLD AUTO: 0.4 % (ref 0–0.5)
LDLC SERPL CALC-MCNC: 125 MG/DL (ref 0–100)
LDLC/HDLC SERPL: 3 {RATIO}
LYMPHOCYTES # BLD AUTO: 2.43 10*3/MM3 (ref 0.7–3.1)
LYMPHOCYTES NFR BLD AUTO: 29.2 % (ref 19.6–45.3)
MCH RBC QN AUTO: 28.2 PG (ref 26.6–33)
MCHC RBC AUTO-ENTMCNC: 34 G/DL (ref 31.5–35.7)
MCV RBC AUTO: 82.8 FL (ref 79–97)
MONOCYTES # BLD AUTO: 0.51 10*3/MM3 (ref 0.1–0.9)
MONOCYTES NFR BLD AUTO: 6.1 % (ref 5–12)
NEUTROPHILS NFR BLD AUTO: 4.79 10*3/MM3 (ref 1.7–7)
NEUTROPHILS NFR BLD AUTO: 57.7 % (ref 42.7–76)
NRBC BLD AUTO-RTO: 0 /100 WBC (ref 0–0.2)
PLATELET # BLD AUTO: 459 10*3/MM3 (ref 140–450)
PMV BLD AUTO: 11.6 FL (ref 6–12)
POTASSIUM SERPL-SCNC: 4.1 MMOL/L (ref 3.5–5.2)
PROT SERPL-MCNC: 7.4 G/DL (ref 6–8.5)
RBC # BLD AUTO: 4.76 10*6/MM3 (ref 3.77–5.28)
SODIUM SERPL-SCNC: 139 MMOL/L (ref 136–145)
TRIGL SERPL-MCNC: 200 MG/DL (ref 0–150)
TSH SERPL DL<=0.05 MIU/L-ACNC: 2.69 UIU/ML (ref 0.27–4.2)
VLDLC SERPL-MCNC: 35 MG/DL (ref 5–40)
WBC NRBC COR # BLD: 8.31 10*3/MM3 (ref 3.4–10.8)

## 2022-04-05 ENCOUNTER — OFFICE VISIT (OUTPATIENT)
Dept: FAMILY MEDICINE CLINIC | Facility: CLINIC | Age: 47
End: 2022-04-05

## 2022-04-05 VITALS
OXYGEN SATURATION: 98 % | WEIGHT: 192 LBS | DIASTOLIC BLOOD PRESSURE: 80 MMHG | TEMPERATURE: 96.9 F | HEART RATE: 103 BPM | BODY MASS INDEX: 35.33 KG/M2 | HEIGHT: 62 IN | SYSTOLIC BLOOD PRESSURE: 138 MMHG

## 2022-04-05 DIAGNOSIS — K57.92 DIVERTICULITIS: ICD-10-CM

## 2022-04-05 DIAGNOSIS — E55.9 VITAMIN D DEFICIENCY: Primary | ICD-10-CM

## 2022-04-05 PROCEDURE — 99214 OFFICE O/P EST MOD 30 MIN: CPT | Performed by: FAMILY MEDICINE

## 2022-04-05 RX ORDER — ERGOCALCIFEROL 1.25 MG/1
50000 CAPSULE ORAL WEEKLY
Qty: 4 CAPSULE | Refills: 2 | Status: SHIPPED | OUTPATIENT
Start: 2022-04-05 | End: 2022-05-05

## 2022-04-05 NOTE — PROGRESS NOTES
"Chief Complaint  Diverticulitis (F/u)    Subjective          Nohemi Ospina presents to [facility]  Patient is here today to follow-up on diverticulitis as well as elevated blood pressure.  States her stomach pain is doing much better she seen Dr. Gomez last week and she has referred her to Dr. Pizarro for colonoscopy.  Patient states she has not got a call about the appointment yet.  States she did have her Pap last year during July.  Patient has been checking her BP at home and it is staying below 140/80.  We will continue to monitor blood pressure at this time and not start medications.  Reviewed lab work with patient was found to have vitamin D deficiency will start weekly vitamin D.  Discussed lipid levels we will implement diet changes and follow-up in 3 months.      Review of Systems      Objective   Vital Signs:   /80 (BP Location: Left arm, Patient Position: Sitting, Cuff Size: Adult)   Pulse 103   Temp 96.9 °F (36.1 °C) (Temporal)   Ht 157.5 cm (62\")   Wt 87.1 kg (192 lb)   SpO2 98%   BMI 35.12 kg/m²     Physical Exam  Constitutional:       General: She is not in acute distress.     Appearance: Normal appearance. She is well-developed and well-groomed. She is not ill-appearing, toxic-appearing or diaphoretic.   HENT:      Head: Normocephalic.      Nose: Nose normal. No congestion or rhinorrhea.      Mouth/Throat:      Mouth: Mucous membranes are moist.      Pharynx: Oropharynx is clear. No oropharyngeal exudate or posterior oropharyngeal erythema.   Eyes:      General: Lids are normal.         Right eye: No discharge.         Left eye: No discharge.      Extraocular Movements: Extraocular movements intact.      Pupils: Pupils are equal, round, and reactive to light.   Neck:      Vascular: No carotid bruit.   Cardiovascular:      Rate and Rhythm: Normal rate and regular rhythm.      Pulses: Normal pulses.      Heart sounds: Normal heart sounds. No murmur heard.   "  No friction rub. No gallop.   Pulmonary:      Effort: Pulmonary effort is normal. No respiratory distress.      Breath sounds: Normal breath sounds. No stridor. No wheezing, rhonchi or rales.   Chest:      Chest wall: No tenderness.   Abdominal:      General: Bowel sounds are normal. There is no distension.      Palpations: Abdomen is soft. There is no mass.      Tenderness: There is no abdominal tenderness. There is no right CVA tenderness, left CVA tenderness, guarding or rebound.      Hernia: No hernia is present.   Musculoskeletal:         General: No swelling or tenderness. Normal range of motion.      Cervical back: Normal range of motion and neck supple. No rigidity or tenderness.      Right lower leg: No edema.      Left lower leg: No edema.   Lymphadenopathy:      Cervical: No cervical adenopathy.   Skin:     General: Skin is warm.      Capillary Refill: Capillary refill takes less than 2 seconds.      Coloration: Skin is not jaundiced.      Findings: No bruising, erythema or rash.   Neurological:      General: No focal deficit present.      Mental Status: She is alert and oriented to person, place, and time.      Motor: Motor function is intact. No weakness.      Coordination: Coordination is intact.      Gait: Gait is intact. Gait normal.   Psychiatric:         Attention and Perception: Attention normal.         Mood and Affect: Mood normal.         Speech: Speech normal.         Behavior: Behavior normal.         Cognition and Memory: Cognition normal.         Judgment: Judgment normal.        Result Review :                 Assessment and Plan    Diagnoses and all orders for this visit:    1. Vitamin D deficiency (Primary)  -     vitamin D (ERGOCALCIFEROL) 1.25 MG (42375 UT) capsule capsule; Take 1 capsule by mouth 1 (One) Time Per Week for 30 days.  Dispense: 4 capsule; Refill: 2    2. Diverticulitis      Patient's Body mass index is 35.12 kg/m². indicating that she is obese (BMI >30). Obesity-related  health conditions include the following: none. Obesity is unchanged. BMI is is above average; BMI management plan is completed. We discussed low calorie, low carb based diet program, portion control and increasing exercise..    Follow Up   Return in about 3 months (around 7/5/2022), or if symptoms worsen or fail to improve.  Patient was given instructions and counseling regarding her condition or for health maintenance advice. Please see specific information pulled into the AVS if appropriate.

## 2022-04-14 ENCOUNTER — APPOINTMENT (OUTPATIENT)
Dept: MAMMOGRAPHY | Facility: HOSPITAL | Age: 47
End: 2022-04-14

## 2022-05-05 ENCOUNTER — OFFICE VISIT (OUTPATIENT)
Dept: GASTROENTEROLOGY | Facility: CLINIC | Age: 47
End: 2022-05-05

## 2022-05-05 VITALS
BODY MASS INDEX: 35.63 KG/M2 | DIASTOLIC BLOOD PRESSURE: 92 MMHG | WEIGHT: 193.6 LBS | HEIGHT: 62 IN | HEART RATE: 82 BPM | SYSTOLIC BLOOD PRESSURE: 154 MMHG

## 2022-05-05 DIAGNOSIS — R10.32 LEFT LOWER QUADRANT ABDOMINAL PAIN: ICD-10-CM

## 2022-05-05 DIAGNOSIS — K57.92 DIVERTICULITIS: Primary | ICD-10-CM

## 2022-05-05 PROCEDURE — 99204 OFFICE O/P NEW MOD 45 MIN: CPT | Performed by: INTERNAL MEDICINE

## 2022-05-05 RX ORDER — BISACODYL 5 MG
TABLET, DELAYED RELEASE (ENTERIC COATED) ORAL
Qty: 4 TABLET | Refills: 0 | Status: SHIPPED | OUTPATIENT
Start: 2022-05-05 | End: 2022-06-01 | Stop reason: HOSPADM

## 2022-05-05 NOTE — PROGRESS NOTES
"Kim Ospina is a 46 y.o. female who presents to the office today as a consultation from Dinora Gomez MD for evaluation of Diverticulitis        History of Present Illness:  The patient states that she has a history of diverticulitis and had a \"leak in my bowel\". On 3/11/22, she went to the ER with severe pain in the LLQ of the abdomen.  That day she felt that she was unable to use the bathroom and felt bloated. The CT scan revealed - severe segmental acute diverticulitis involving the low descending colon with evidence of localized perforation and sigmoid and descending colonic diverticulosis.  She was admitted to the hospital for 4 days to receive IV abx.  She was sent home on abx.  Currently, she has some mild pain in the left lower abdomen.  She does not feel nauseated.  She is having bowel movements.  She has a bowel movement daily to every other day.  Her stool is a [place] score 3 to 4.  She denies incomplete evacuation of the bowel.  She initially lost 9 lbs but has gained most of this back.  No fever or chills.  She is not currently taking fiber.  She states she eats a lot of salads.  She admits she does not drink a lot of water.  She drinks a lot of soda - Mt Dew.      Review of Systems:  Review of Systems   Constitutional: Negative for fever.   HENT: Negative for trouble swallowing.    Eyes: Negative.    Respiratory: Negative for chest tightness.    Cardiovascular: Negative for chest pain.   Gastrointestinal: Positive for abdominal distention, abdominal pain, constipation and nausea. Negative for anal bleeding, blood in stool and diarrhea.   Endocrine: Negative.    Genitourinary: Negative for difficulty urinating.   Musculoskeletal: Negative.    Skin: Negative.    Allergic/Immunologic: Negative.    Neurological: Positive for headaches.   Psychiatric/Behavioral: Negative.        Past Medical History:  Past Medical History:   Diagnosis Date   • Kidney stones        Past Surgical " "History:  Past Surgical History:   Procedure Laterality Date   •  SECTION     • TUBAL ABDOMINAL LIGATION         Family History:  Family History   Problem Relation Age of Onset   • Breast cancer Paternal Aunt    • Heart attack Father    • Hypertension Mother        Social History:  Social History     Socioeconomic History   • Marital status:    Tobacco Use   • Smoking status: Never Smoker   • Smokeless tobacco: Never Used   Vaping Use   • Vaping Use: Never used   Substance and Sexual Activity   • Alcohol use: No   • Drug use: No   • Sexual activity: Yes     Partners: Male     Birth control/protection: Tubal ligation       Current Medication List:    Current Outpatient Medications:   •  vitamin D (ERGOCALCIFEROL) 1.25 MG (82294 UT) capsule capsule, Take 1 capsule by mouth 1 (One) Time Per Week for 30 days., Disp: 4 capsule, Rfl: 2    Allergies:   Naproxen and Penicillins    Vitals:  /92 (BP Location: Left arm, Patient Position: Sitting, Cuff Size: Adult)   Pulse 82   Ht 157.5 cm (62\")   Wt 87.8 kg (193 lb 9.6 oz)   BMI 35.41 kg/m²     Physical Exam:  Physical Exam  Constitutional:       Appearance: She is obese.   HENT:      Head: Normocephalic and atraumatic.      Nose: Nose normal. No congestion or rhinorrhea.   Eyes:      General: No scleral icterus.     Extraocular Movements: Extraocular movements intact.      Conjunctiva/sclera: Conjunctivae normal.      Pupils: Pupils are equal, round, and reactive to light.   Cardiovascular:      Rate and Rhythm: Normal rate and regular rhythm.      Pulses: Normal pulses.      Heart sounds: Normal heart sounds.   Pulmonary:      Effort: Pulmonary effort is normal.      Breath sounds: Normal breath sounds.   Abdominal:      General: Abdomen is flat. Bowel sounds are normal. There is no distension.      Palpations: Abdomen is soft. There is no shifting dullness, fluid wave, hepatomegaly, splenomegaly, mass or pulsatile mass.      Tenderness: There is no " abdominal tenderness. There is no guarding or rebound.      Hernia: No hernia is present.   Musculoskeletal:         General: No swelling or tenderness.      Cervical back: Normal range of motion and neck supple.   Skin:     General: Skin is warm and dry.      Coloration: Skin is not jaundiced.   Neurological:      General: No focal deficit present.      Mental Status: She is alert and oriented to person, place, and time.   Psychiatric:         Mood and Affect: Mood normal.         Behavior: Behavior normal.         Results Review:  Lab Results:   No visits with results within 1 Month(s) from this visit.   Latest known visit with results is:   Lab on 03/31/2022   Component Date Value Ref Range Status   • WBC 03/31/2022 8.31  3.40 - 10.80 10*3/mm3 Final   • RBC 03/31/2022 4.76  3.77 - 5.28 10*6/mm3 Final   • Hemoglobin 03/31/2022 13.4  12.0 - 15.9 g/dL Final   • Hematocrit 03/31/2022 39.4  34.0 - 46.6 % Final   • MCV 03/31/2022 82.8  79.0 - 97.0 fL Final   • MCH 03/31/2022 28.2  26.6 - 33.0 pg Final   • MCHC 03/31/2022 34.0  31.5 - 35.7 g/dL Final   • RDW 03/31/2022 12.9  12.3 - 15.4 % Final   • RDW-SD 03/31/2022 38.5  37.0 - 54.0 fl Final   • MPV 03/31/2022 11.6  6.0 - 12.0 fL Final   • Platelets 03/31/2022 459 (A) 140 - 450 10*3/mm3 Final   • Neutrophil % 03/31/2022 57.7  42.7 - 76.0 % Final   • Lymphocyte % 03/31/2022 29.2  19.6 - 45.3 % Final   • Monocyte % 03/31/2022 6.1  5.0 - 12.0 % Final   • Eosinophil % 03/31/2022 4.6  0.3 - 6.2 % Final   • Basophil % 03/31/2022 2.0 (A) 0.0 - 1.5 % Final   • Immature Grans % 03/31/2022 0.4  0.0 - 0.5 % Final   • Neutrophils, Absolute 03/31/2022 4.79  1.70 - 7.00 10*3/mm3 Final   • Lymphocytes, Absolute 03/31/2022 2.43  0.70 - 3.10 10*3/mm3 Final   • Monocytes, Absolute 03/31/2022 0.51  0.10 - 0.90 10*3/mm3 Final   • Eosinophils, Absolute 03/31/2022 0.38  0.00 - 0.40 10*3/mm3 Final   • Basophils, Absolute 03/31/2022 0.17  0.00 - 0.20 10*3/mm3 Final   • Immature Grans,  Absolute 03/31/2022 0.03  0.00 - 0.05 10*3/mm3 Final   • nRBC 03/31/2022 0.0  0.0 - 0.2 /100 WBC Final   • Glucose 03/31/2022 88  65 - 99 mg/dL Final   • BUN 03/31/2022 10  6 - 20 mg/dL Final   • Creatinine 03/31/2022 0.79  0.57 - 1.00 mg/dL Final   • Sodium 03/31/2022 139  136 - 145 mmol/L Final   • Potassium 03/31/2022 4.1  3.5 - 5.2 mmol/L Final   • Chloride 03/31/2022 102  98 - 107 mmol/L Final   • CO2 03/31/2022 25.4  22.0 - 29.0 mmol/L Final   • Calcium 03/31/2022 9.4  8.6 - 10.5 mg/dL Final   • Total Protein 03/31/2022 7.4  6.0 - 8.5 g/dL Final   • Albumin 03/31/2022 3.90  3.50 - 5.20 g/dL Final   • ALT (SGPT) 03/31/2022 22  1 - 33 U/L Final   • AST (SGOT) 03/31/2022 15  1 - 32 U/L Final   • Alkaline Phosphatase 03/31/2022 63  39 - 117 U/L Final   • Total Bilirubin 03/31/2022 0.2  0.0 - 1.2 mg/dL Final   • Globulin 03/31/2022 3.5  gm/dL Final   • A/G Ratio 03/31/2022 1.1  g/dL Final   • BUN/Creatinine Ratio 03/31/2022 12.7  7.0 - 25.0 Final   • Anion Gap 03/31/2022 11.6  5.0 - 15.0 mmol/L Final   • eGFR 03/31/2022 94.1  >60.0 mL/min/1.73 Final    National Kidney Foundation and American Society of Nephrology (ASN) Task Force recommended calculation based on the Chronic Kidney Disease Epidemiology Collaboration (CKD-EPI) equation refit without adjustment for race.   • Hemoglobin A1C 03/31/2022 5.70 (A) 4.80 - 5.60 % Final   • Total Cholesterol 03/31/2022 200  0 - 200 mg/dL Final   • Triglycerides 03/31/2022 200 (A) 0 - 150 mg/dL Final   • HDL Cholesterol 03/31/2022 40  40 - 60 mg/dL Final   • LDL Cholesterol  03/31/2022 125 (A) 0 - 100 mg/dL Final   • VLDL Cholesterol 03/31/2022 35  5 - 40 mg/dL Final   • LDL/HDL Ratio 03/31/2022 3.00   Final   • TSH 03/31/2022 2.690  0.270 - 4.200 uIU/mL Final   • 25 Hydroxy, Vitamin D 03/31/2022 13.6 (A) 30.0 - 100.0 ng/ml Final       Assessment/Plan     Visit Diagnoses:    ICD-10-CM ICD-9-CM   1. Diverticulitis  K57.92 562.11   2. Left lower quadrant abdominal pain  R10.32  789.04       Plan:  The patient will undergo colonoscopy for further evaluation.  She will start FiberCon 2 caplets daily with 8 oz water.  She will continue a high fiber diet. I have encouraged her to stop the Mt Dew and drink more water. Follow up after colonoscopy.      COLONOSCOPY (N/A)      MEDS ORDERED DURING VISIT:  No orders of the defined types were placed in this encounter.                   This document has been electronically signed by Neda Salgado MD   May 5, 2022 14:02 EDT        Part of this note may be an electronic transcription/translation of spoken language to printed text using the Dragon Dictation System.

## 2022-05-09 DIAGNOSIS — K57.92 DIVERTICULITIS: ICD-10-CM

## 2022-05-09 DIAGNOSIS — R10.32 LEFT LOWER QUADRANT ABDOMINAL PAIN: Primary | ICD-10-CM

## 2022-05-24 PROBLEM — R10.32 LEFT LOWER QUADRANT ABDOMINAL PAIN: Status: ACTIVE | Noted: 2022-05-24

## 2022-05-30 ENCOUNTER — LAB (OUTPATIENT)
Dept: LAB | Facility: HOSPITAL | Age: 47
End: 2022-05-30

## 2022-05-30 DIAGNOSIS — R10.32 LEFT LOWER QUADRANT ABDOMINAL PAIN: ICD-10-CM

## 2022-05-30 DIAGNOSIS — K57.92 DIVERTICULITIS: ICD-10-CM

## 2022-05-30 LAB — SARS-COV-2 RNA PNL SPEC NAA+PROBE: NOT DETECTED

## 2022-05-30 PROCEDURE — U0004 COV-19 TEST NON-CDC HGH THRU: HCPCS

## 2022-05-30 PROCEDURE — C9803 HOPD COVID-19 SPEC COLLECT: HCPCS

## 2022-05-31 RX ORDER — ERGOCALCIFEROL 1.25 MG/1
50000 CAPSULE ORAL WEEKLY
COMMUNITY
Start: 2022-05-17

## 2022-06-01 ENCOUNTER — ANESTHESIA (OUTPATIENT)
Dept: PERIOP | Facility: HOSPITAL | Age: 47
End: 2022-06-01

## 2022-06-01 ENCOUNTER — ANESTHESIA EVENT (OUTPATIENT)
Dept: PERIOP | Facility: HOSPITAL | Age: 47
End: 2022-06-01

## 2022-06-01 ENCOUNTER — HOSPITAL ENCOUNTER (OUTPATIENT)
Facility: HOSPITAL | Age: 47
Setting detail: HOSPITAL OUTPATIENT SURGERY
Discharge: HOME | End: 2022-06-01
Attending: INTERNAL MEDICINE | Admitting: INTERNAL MEDICINE

## 2022-06-01 VITALS
SYSTOLIC BLOOD PRESSURE: 131 MMHG | BODY MASS INDEX: 34.23 KG/M2 | HEART RATE: 69 BPM | WEIGHT: 186 LBS | HEIGHT: 62 IN | TEMPERATURE: 97.3 F | OXYGEN SATURATION: 98 % | RESPIRATION RATE: 20 BRPM | DIASTOLIC BLOOD PRESSURE: 86 MMHG

## 2022-06-01 DIAGNOSIS — R10.32 LEFT LOWER QUADRANT ABDOMINAL PAIN: ICD-10-CM

## 2022-06-01 DIAGNOSIS — K57.92 DIVERTICULITIS: ICD-10-CM

## 2022-06-01 LAB
B-HCG UR QL: NEGATIVE
EXPIRATION DATE: NORMAL
INTERNAL NEGATIVE CONTROL: NEGATIVE
INTERNAL POSITIVE CONTROL: POSITIVE
Lab: NORMAL

## 2022-06-01 PROCEDURE — 81025 URINE PREGNANCY TEST: CPT | Performed by: ANESTHESIOLOGY

## 2022-06-01 PROCEDURE — 45385 COLONOSCOPY W/LESION REMOVAL: CPT | Performed by: INTERNAL MEDICINE

## 2022-06-01 PROCEDURE — 25010000002 PROPOFOL 10 MG/ML EMULSION: Performed by: NURSE ANESTHETIST, CERTIFIED REGISTERED

## 2022-06-01 RX ORDER — FENTANYL CITRATE 50 UG/ML
50 INJECTION, SOLUTION INTRAMUSCULAR; INTRAVENOUS
Status: DISCONTINUED | OUTPATIENT
Start: 2022-06-01 | End: 2022-06-01 | Stop reason: HOSPADM

## 2022-06-01 RX ORDER — SODIUM CHLORIDE, SODIUM LACTATE, POTASSIUM CHLORIDE, CALCIUM CHLORIDE 600; 310; 30; 20 MG/100ML; MG/100ML; MG/100ML; MG/100ML
100 INJECTION, SOLUTION INTRAVENOUS ONCE AS NEEDED
Status: DISCONTINUED | OUTPATIENT
Start: 2022-06-01 | End: 2022-06-01 | Stop reason: HOSPADM

## 2022-06-01 RX ORDER — SODIUM CHLORIDE 0.9 % (FLUSH) 0.9 %
10 SYRINGE (ML) INJECTION EVERY 12 HOURS SCHEDULED
Status: DISCONTINUED | OUTPATIENT
Start: 2022-06-01 | End: 2022-06-01 | Stop reason: HOSPADM

## 2022-06-01 RX ORDER — MEPERIDINE HYDROCHLORIDE 25 MG/ML
12.5 INJECTION INTRAMUSCULAR; INTRAVENOUS; SUBCUTANEOUS
Status: DISCONTINUED | OUTPATIENT
Start: 2022-06-01 | End: 2022-06-01 | Stop reason: HOSPADM

## 2022-06-01 RX ORDER — IPRATROPIUM BROMIDE AND ALBUTEROL SULFATE 2.5; .5 MG/3ML; MG/3ML
3 SOLUTION RESPIRATORY (INHALATION) ONCE AS NEEDED
Status: DISCONTINUED | OUTPATIENT
Start: 2022-06-01 | End: 2022-06-01 | Stop reason: HOSPADM

## 2022-06-01 RX ORDER — SODIUM CHLORIDE, SODIUM LACTATE, POTASSIUM CHLORIDE, CALCIUM CHLORIDE 600; 310; 30; 20 MG/100ML; MG/100ML; MG/100ML; MG/100ML
125 INJECTION, SOLUTION INTRAVENOUS ONCE
Status: COMPLETED | OUTPATIENT
Start: 2022-06-01 | End: 2022-06-01

## 2022-06-01 RX ORDER — OXYCODONE HYDROCHLORIDE AND ACETAMINOPHEN 5; 325 MG/1; MG/1
1 TABLET ORAL ONCE AS NEEDED
Status: DISCONTINUED | OUTPATIENT
Start: 2022-06-01 | End: 2022-06-01 | Stop reason: HOSPADM

## 2022-06-01 RX ORDER — ONDANSETRON 2 MG/ML
4 INJECTION INTRAMUSCULAR; INTRAVENOUS AS NEEDED
Status: DISCONTINUED | OUTPATIENT
Start: 2022-06-01 | End: 2022-06-01 | Stop reason: HOSPADM

## 2022-06-01 RX ORDER — SODIUM CHLORIDE 0.9 % (FLUSH) 0.9 %
10 SYRINGE (ML) INJECTION AS NEEDED
Status: DISCONTINUED | OUTPATIENT
Start: 2022-06-01 | End: 2022-06-01 | Stop reason: HOSPADM

## 2022-06-01 RX ORDER — PROPOFOL 10 MG/ML
VIAL (ML) INTRAVENOUS AS NEEDED
Status: DISCONTINUED | OUTPATIENT
Start: 2022-06-01 | End: 2022-06-01 | Stop reason: SURG

## 2022-06-01 RX ORDER — MIDAZOLAM HYDROCHLORIDE 1 MG/ML
1 INJECTION INTRAMUSCULAR; INTRAVENOUS
Status: DISCONTINUED | OUTPATIENT
Start: 2022-06-01 | End: 2022-06-01 | Stop reason: HOSPADM

## 2022-06-01 RX ORDER — LIDOCAINE HYDROCHLORIDE 20 MG/ML
INJECTION, SOLUTION INFILTRATION; PERINEURAL AS NEEDED
Status: DISCONTINUED | OUTPATIENT
Start: 2022-06-01 | End: 2022-06-01 | Stop reason: SURG

## 2022-06-01 RX ADMIN — PROPOFOL 50 MG: 10 INJECTION, EMULSION INTRAVENOUS at 12:14

## 2022-06-01 RX ADMIN — PROPOFOL 200 MCG/KG/MIN: 10 INJECTION, EMULSION INTRAVENOUS at 12:14

## 2022-06-01 RX ADMIN — SODIUM CHLORIDE, POTASSIUM CHLORIDE, SODIUM LACTATE AND CALCIUM CHLORIDE: 600; 310; 30; 20 INJECTION, SOLUTION INTRAVENOUS at 12:14

## 2022-06-01 RX ADMIN — LIDOCAINE HYDROCHLORIDE 60 MG: 20 INJECTION, SOLUTION INFILTRATION; PERINEURAL at 12:14

## 2022-06-01 NOTE — ANESTHESIA PREPROCEDURE EVALUATION
Anesthesia Evaluation     Patient summary reviewed and Nursing notes reviewed   no history of anesthetic complications:               Airway   Mallampati: I  TM distance: >3 FB  Neck ROM: full  No difficulty expected  Dental - normal exam     Pulmonary - negative pulmonary ROS and normal exam   Cardiovascular - negative cardio ROS and normal exam  Exercise tolerance: good (4-7 METS)    NYHA Classification: II        Neuro/Psych  (+) numbness,    GI/Hepatic/Renal/Endo    (+)   renal disease,     Musculoskeletal (-) negative ROS    Abdominal  - normal exam    Bowel sounds: normal.   Substance History - negative use     OB/GYN negative ob/gyn ROS         Other - negative ROS                       Anesthesia Plan    ASA 2     general     intravenous induction     Anesthetic plan, all risks, benefits, and alternatives have been provided, discussed and informed consent has been obtained with: patient.        CODE STATUS:

## 2022-06-06 LAB — REF LAB TEST METHOD: NORMAL

## 2022-06-07 NOTE — PROGRESS NOTES
At the time of your recent colonoscopy, a polyp was removed.  The polyp was completely benign.  You will need repeat colonoscopy in 10 years for screening purposes.

## 2022-07-28 ENCOUNTER — OFFICE VISIT (OUTPATIENT)
Dept: GASTROENTEROLOGY | Facility: CLINIC | Age: 47
End: 2022-07-28

## 2022-07-28 VITALS
DIASTOLIC BLOOD PRESSURE: 83 MMHG | HEART RATE: 86 BPM | HEIGHT: 62 IN | OXYGEN SATURATION: 97 % | BODY MASS INDEX: 35.85 KG/M2 | SYSTOLIC BLOOD PRESSURE: 164 MMHG | WEIGHT: 194.8 LBS

## 2022-07-28 DIAGNOSIS — K57.92 DIVERTICULITIS: Primary | ICD-10-CM

## 2022-07-28 DIAGNOSIS — R10.32 LEFT LOWER QUADRANT ABDOMINAL PAIN: ICD-10-CM

## 2022-07-28 PROCEDURE — 99213 OFFICE O/P EST LOW 20 MIN: CPT | Performed by: INTERNAL MEDICINE

## 2022-07-28 NOTE — PROGRESS NOTES
Subjective   Nohemi Ospina is a 47 y.o. female who presents to the office today as a follow up appointment regarding colonoscopy follow up      History of Present Illness:  The patient presents for follow up after colonoscopy due to history of diverticulitis and LLQ abdominal pain.  The pain has resolved.  She is taking Metamucil periodically.  She continues to drink Mt Dew.  She has tried to reduce the amount of Mt Dew.  She is trying to drink more water.  No abdominal pain.  She is having a bowel movement every other day.  She does not feel constipated.  She does not strain.  She denies incomplete evacuation of the bowel.  Her stool is a [place] score 4.  She has only had one episode of diverticulitis in her lifetime that she is aware of.  There is no no family history of colon cancer.  She will need repeat colonoscopy in 10 years for screening purposes.      Review of Systems:  Review of Systems   Constitutional: Negative for fever.   HENT: Negative for trouble swallowing.    Eyes: Negative.    Respiratory: Negative for chest tightness.    Cardiovascular: Negative for chest pain.   Gastrointestinal: Positive for abdominal distention and nausea. Negative for abdominal pain, anal bleeding, blood in stool, constipation, diarrhea and vomiting.   Endocrine: Negative.    Genitourinary: Negative for difficulty urinating.   Musculoskeletal: Negative.    Skin: Negative.    Allergic/Immunologic: Negative.    Neurological: Positive for headaches.   Psychiatric/Behavioral: Negative.        Past Medical History:  Past Medical History:   Diagnosis Date   • Kidney stones        Past Surgical History:  Past Surgical History:   Procedure Laterality Date   •  SECTION     • COLONOSCOPY N/A 2022    Procedure: COLONOSCOPY;  Surgeon: Neda Salgado MD;  Location: [facility];  Service: Gastroenterology;  Laterality: N/A;   • TUBAL ABDOMINAL LIGATION         Family History:  Family History   Problem Relation Age of  "Onset   • Breast cancer Paternal Aunt    • Heart attack Father    • Hypertension Mother        Social History:  Social History     Socioeconomic History   • Marital status:    Tobacco Use   • Smoking status: Never Smoker   • Smokeless tobacco: Never Used   Vaping Use   • Vaping Use: Never used   Substance and Sexual Activity   • Alcohol use: No   • Drug use: No   • Sexual activity: Yes     Partners: Male     Birth control/protection: Tubal ligation       Current Medication List:    Current Outpatient Medications:   •  vitamin D (ERGOCALCIFEROL) 1.25 MG (80378 UT) capsule capsule, Take 50,000 Units by mouth 1 (One) Time Per Week., Disp: , Rfl:     Allergies:   Naproxen and Penicillins    Vitals:  /83   Pulse 86   Ht 157.5 cm (62\")   Wt 88.4 kg (194 lb 12.8 oz)   SpO2 97%   BMI 35.63 kg/m²     Physical Exam:  Physical Exam  Constitutional:       Appearance: She is obese.   HENT:      Head: Normocephalic and atraumatic.      Nose: Nose normal. No congestion or rhinorrhea.   Eyes:      General: No scleral icterus.     Extraocular Movements: Extraocular movements intact.      Conjunctiva/sclera: Conjunctivae normal.      Pupils: Pupils are equal, round, and reactive to light.   Cardiovascular:      Rate and Rhythm: Normal rate and regular rhythm.      Pulses: Normal pulses.      Heart sounds: Normal heart sounds.   Pulmonary:      Effort: Pulmonary effort is normal.      Breath sounds: Normal breath sounds.   Abdominal:      General: Abdomen is flat. Bowel sounds are normal. There is no distension.      Palpations: Abdomen is soft. There is no shifting dullness, fluid wave, hepatomegaly, splenomegaly, mass or pulsatile mass.      Tenderness: There is no abdominal tenderness. There is no guarding or rebound.      Hernia: No hernia is present.   Musculoskeletal:         General: No swelling or tenderness.      Cervical back: Normal range of motion and neck supple.   Skin:     General: Skin is warm and " dry.      Coloration: Skin is not jaundiced.   Neurological:      General: No focal deficit present.      Mental Status: She is alert and oriented to person, place, and time.   Psychiatric:         Mood and Affect: Mood normal.         Behavior: Behavior normal.         Results Review:  Lab Results:   No visits with results within 1 Month(s) from this visit.   Latest known visit with results is:   Admission on 2022, Discharged on 2022   Component Date Value Ref Range Status   • HCG, Urine, QL 2022 Negative  Negative Final   • Lot Number 2022 [id number]   Final   • Internal Positive Control 2022 Positive  Positive, Passed Final   • Internal Negative Control 2022 Negative  Negative, Passed Final   • Expiration Date 2022 [id number]   Final   • Reference Lab Report 2022    Final                    Value:[facility]  [address] [place] [address]  Phone: [phone] or [phone]  Fax: [phone]  Leonardo Doyle M.D., Medical Director    PATIENT NAME                           LABORATORY NO.  [id number]  LUDA ESPINOSA                          [id number]  [id number]                         AGE              SEX  SSN           CLIENT REF #  [facility]              46      1975  F    [id number]   [id number]    [address]                     REQUESTING M.D.     ATTENDING [facility] [address]                   JED PUENTES  DATE COLLECTED      DATE RECEIVED      DATE REPORTED  2022    DIAGNOSIS:  SIGMOID COLON POLYP:  Benign lymphoid nodule with minimal mucosal hyperplasia (see microscopic  description)    [facility]    CLINICAL HISTORY:  Diverticulitis  Left lower quadrant abdominal pain    SPECIMENS RECEIVED:  SIGMOID COLON POLYP    MICROSCOPIC DESCRIPTION:  Sections                           demonstrate strips of benign colonic mucosa with minimal  hyperplastic  change. A prominent lymphoid nodule is present in one tissue fragment. There is  no evidence of dysplasia or malignancy.    Professional interpretation rendered by June Powers M.D., [place] at  [facility], [address] [place], [place] [id number].    GROSS DESCRIPTION:  Pieces: Multiple.  Aggregate dimensions: 0.6 x 0.5 x 0.1 cm.  Cassettes:  One,  entirely submitted.  [facility]    REVIEWED, DIAGNOSED AND ELECTRONICALLY  SIGNED BY:    June Powers M.D., [place]  CPT CODES:  37843         Assessment & Plan     Visit Diagnoses:    ICD-10-CM ICD-9-CM   1. Diverticulitis  K57.92 562.11   2. Left lower quadrant abdominal pain  R10.32 789.04       Plan:  The patient will begin FiberCon 2 caplets daily with 8 ounces of fluid due to diverticulitis/diverticulosis.  She is doing well currently.  I will have her follow-up as needed.          MEDS ORDERED DURING VISIT:  No orders of the defined types were placed in this encounter.      Return if symptoms worsen or fail to improve.             This document has been electronically signed by Neda Salgado MD   July 28, 2022 15:47 EDT      Part of this note may be an electronic transcription/translation of spoken language to printed text using the Dragon Dictation System.

## 2024-02-12 ENCOUNTER — OFFICE VISIT (OUTPATIENT)
Dept: UROLOGY | Facility: CLINIC | Age: 49
End: 2024-02-12
Payer: COMMERCIAL

## 2024-02-12 ENCOUNTER — HOSPITAL ENCOUNTER (OUTPATIENT)
Dept: GENERAL RADIOLOGY | Facility: HOSPITAL | Age: 49
Discharge: HOME | End: 2024-02-12
Payer: COMMERCIAL

## 2024-02-12 VITALS
SYSTOLIC BLOOD PRESSURE: 123 MMHG | HEIGHT: 62 IN | HEART RATE: 67 BPM | BODY MASS INDEX: 35.26 KG/M2 | WEIGHT: 191.6 LBS | DIASTOLIC BLOOD PRESSURE: 76 MMHG

## 2024-02-12 DIAGNOSIS — R31.29 MICROSCOPIC HEMATURIA: ICD-10-CM

## 2024-02-12 DIAGNOSIS — R10.9 RIGHT FLANK PAIN: Primary | ICD-10-CM

## 2024-02-12 DIAGNOSIS — R35.0 FREQUENCY OF MICTURITION: ICD-10-CM

## 2024-02-12 DIAGNOSIS — N20.0 RENAL CALCULUS: ICD-10-CM

## 2024-02-12 LAB
BILIRUB BLD-MCNC: NEGATIVE MG/DL
CLARITY, POC: CLEAR
COLOR UR: YELLOW
EXPIRATION DATE: ABNORMAL
GLUCOSE UR STRIP-MCNC: NEGATIVE MG/DL
KETONES UR QL: NEGATIVE
LEUKOCYTE EST, POC: NEGATIVE
Lab: ABNORMAL
NITRITE UR-MCNC: NEGATIVE MG/ML
PH UR: 6 [PH] (ref 5–8)
PROT UR STRIP-MCNC: ABNORMAL MG/DL
RBC # UR STRIP: ABNORMAL /UL
SP GR UR: 1.02 (ref 1–1.03)
UROBILINOGEN UR QL: NORMAL

## 2024-02-12 PROCEDURE — 1159F MED LIST DOCD IN RCRD: CPT

## 2024-02-12 PROCEDURE — 87086 URINE CULTURE/COLONY COUNT: CPT

## 2024-02-12 PROCEDURE — 74018 RADEX ABDOMEN 1 VIEW: CPT | Performed by: RADIOLOGY

## 2024-02-12 PROCEDURE — 74018 RADEX ABDOMEN 1 VIEW: CPT

## 2024-02-12 PROCEDURE — 99203 OFFICE O/P NEW LOW 30 MIN: CPT

## 2024-02-12 PROCEDURE — 1160F RVW MEDS BY RX/DR IN RCRD: CPT

## 2024-02-12 RX ORDER — TAMSULOSIN HYDROCHLORIDE 0.4 MG/1
1 CAPSULE ORAL DAILY
Qty: 30 CAPSULE | Refills: 0 | Status: SHIPPED | OUTPATIENT
Start: 2024-02-12

## 2024-02-13 ENCOUNTER — TELEPHONE (OUTPATIENT)
Dept: UROLOGY | Facility: CLINIC | Age: 49
End: 2024-02-13
Payer: COMMERCIAL

## 2024-02-13 LAB — BACTERIA SPEC AEROBE CULT: NO GROWTH

## 2024-06-30 ENCOUNTER — APPOINTMENT (OUTPATIENT)
Dept: CT IMAGING | Facility: HOSPITAL | Age: 49
End: 2024-06-30
Payer: COMMERCIAL

## 2024-06-30 ENCOUNTER — APPOINTMENT (OUTPATIENT)
Dept: GENERAL RADIOLOGY | Facility: HOSPITAL | Age: 49
End: 2024-06-30
Payer: COMMERCIAL

## 2024-06-30 ENCOUNTER — HOSPITAL ENCOUNTER (EMERGENCY)
Facility: HOSPITAL | Age: 49
Discharge: HOME | End: 2024-06-30
Attending: EMERGENCY MEDICINE | Admitting: EMERGENCY MEDICINE
Payer: COMMERCIAL

## 2024-06-30 VITALS
DIASTOLIC BLOOD PRESSURE: 79 MMHG | HEART RATE: 62 BPM | OXYGEN SATURATION: 99 % | HEIGHT: 61 IN | BODY MASS INDEX: 33.99 KG/M2 | TEMPERATURE: 96.1 F | RESPIRATION RATE: 20 BRPM | SYSTOLIC BLOOD PRESSURE: 142 MMHG | WEIGHT: 180 LBS

## 2024-06-30 DIAGNOSIS — J18.9 PNEUMONIA OF LEFT UPPER LOBE DUE TO INFECTIOUS ORGANISM: Primary | ICD-10-CM

## 2024-06-30 LAB
ALBUMIN SERPL-MCNC: 3.9 G/DL (ref 3.5–5.2)
ALBUMIN/GLOB SERPL: 1.1 G/DL
ALP SERPL-CCNC: 81 U/L (ref 39–117)
ALT SERPL W P-5'-P-CCNC: 16 U/L (ref 1–33)
ANION GAP SERPL CALCULATED.3IONS-SCNC: 10.5 MMOL/L (ref 5–15)
AST SERPL-CCNC: 15 U/L (ref 1–32)
BASOPHILS # BLD AUTO: 0.09 10*3/MM3 (ref 0–0.2)
BASOPHILS NFR BLD AUTO: 0.9 % (ref 0–1.5)
BILIRUB SERPL-MCNC: 0.2 MG/DL (ref 0–1.2)
BUN SERPL-MCNC: 12 MG/DL (ref 6–20)
BUN/CREAT SERPL: 12.8 (ref 7–25)
CALCIUM SPEC-SCNC: 9 MG/DL (ref 8.6–10.5)
CHLORIDE SERPL-SCNC: 105 MMOL/L (ref 98–107)
CO2 SERPL-SCNC: 25.5 MMOL/L (ref 22–29)
CREAT SERPL-MCNC: 0.94 MG/DL (ref 0.57–1)
DEPRECATED RDW RBC AUTO: 41.4 FL (ref 37–54)
EGFRCR SERPLBLD CKD-EPI 2021: 75.5 ML/MIN/1.73
EOSINOPHIL # BLD AUTO: 0.41 10*3/MM3 (ref 0–0.4)
EOSINOPHIL NFR BLD AUTO: 4.3 % (ref 0.3–6.2)
ERYTHROCYTE [DISTWIDTH] IN BLOOD BY AUTOMATED COUNT: 13.6 % (ref 12.3–15.4)
FLUAV RNA RESP QL NAA+PROBE: NOT DETECTED
FLUBV RNA ISLT QL NAA+PROBE: NOT DETECTED
GLOBULIN UR ELPH-MCNC: 3.6 GM/DL
GLUCOSE SERPL-MCNC: 122 MG/DL (ref 65–99)
HCT VFR BLD AUTO: 40.4 % (ref 34–46.6)
HGB BLD-MCNC: 12.9 G/DL (ref 12–15.9)
HOLD SPECIMEN: NORMAL
HOLD SPECIMEN: NORMAL
IMM GRANULOCYTES # BLD AUTO: 0.03 10*3/MM3 (ref 0–0.05)
IMM GRANULOCYTES NFR BLD AUTO: 0.3 % (ref 0–0.5)
LYMPHOCYTES # BLD AUTO: 2.85 10*3/MM3 (ref 0.7–3.1)
LYMPHOCYTES NFR BLD AUTO: 29.9 % (ref 19.6–45.3)
MCH RBC QN AUTO: 26.7 PG (ref 26.6–33)
MCHC RBC AUTO-ENTMCNC: 31.9 G/DL (ref 31.5–35.7)
MCV RBC AUTO: 83.6 FL (ref 79–97)
MONOCYTES # BLD AUTO: 0.63 10*3/MM3 (ref 0.1–0.9)
MONOCYTES NFR BLD AUTO: 6.6 % (ref 5–12)
NEUTROPHILS NFR BLD AUTO: 5.53 10*3/MM3 (ref 1.7–7)
NEUTROPHILS NFR BLD AUTO: 58 % (ref 42.7–76)
NRBC BLD AUTO-RTO: 0 /100 WBC (ref 0–0.2)
PLATELET # BLD AUTO: 340 10*3/MM3 (ref 140–450)
PMV BLD AUTO: 10.6 FL (ref 6–12)
POTASSIUM SERPL-SCNC: 3.8 MMOL/L (ref 3.5–5.2)
PROT SERPL-MCNC: 7.5 G/DL (ref 6–8.5)
QT INTERVAL: 384 MS
QTC INTERVAL: 446 MS
RBC # BLD AUTO: 4.83 10*6/MM3 (ref 3.77–5.28)
SARS-COV-2 RNA RESP QL NAA+PROBE: NOT DETECTED
SODIUM SERPL-SCNC: 141 MMOL/L (ref 136–145)
T4 FREE SERPL-MCNC: 1.01 NG/DL (ref 0.93–1.7)
TROPONIN T SERPL HS-MCNC: 12 NG/L
TSH SERPL DL<=0.05 MIU/L-ACNC: 1.39 UIU/ML (ref 0.27–4.2)
WBC NRBC COR # BLD AUTO: 9.54 10*3/MM3 (ref 3.4–10.8)
WHOLE BLOOD HOLD COAG: NORMAL
WHOLE BLOOD HOLD SPECIMEN: NORMAL

## 2024-06-30 PROCEDURE — 93005 ELECTROCARDIOGRAM TRACING: CPT | Performed by: EMERGENCY MEDICINE

## 2024-06-30 PROCEDURE — 87636 SARSCOV2 & INF A&B AMP PRB: CPT

## 2024-06-30 PROCEDURE — 93010 ELECTROCARDIOGRAM REPORT: CPT | Performed by: INTERNAL MEDICINE

## 2024-06-30 PROCEDURE — 84484 ASSAY OF TROPONIN QUANT: CPT | Performed by: EMERGENCY MEDICINE

## 2024-06-30 PROCEDURE — 99284 EMERGENCY DEPT VISIT MOD MDM: CPT

## 2024-06-30 PROCEDURE — 71045 X-RAY EXAM CHEST 1 VIEW: CPT

## 2024-06-30 PROCEDURE — 70450 CT HEAD/BRAIN W/O DYE: CPT | Performed by: RADIOLOGY

## 2024-06-30 PROCEDURE — 72125 CT NECK SPINE W/O DYE: CPT | Performed by: RADIOLOGY

## 2024-06-30 PROCEDURE — 71045 X-RAY EXAM CHEST 1 VIEW: CPT | Performed by: RADIOLOGY

## 2024-06-30 PROCEDURE — 80050 GENERAL HEALTH PANEL: CPT | Performed by: EMERGENCY MEDICINE

## 2024-06-30 PROCEDURE — 25010000002 DEXAMETHASONE SODIUM PHOSPHATE 10 MG/ML SOLUTION

## 2024-06-30 PROCEDURE — 70450 CT HEAD/BRAIN W/O DYE: CPT

## 2024-06-30 PROCEDURE — 84439 ASSAY OF FREE THYROXINE: CPT

## 2024-06-30 PROCEDURE — 72125 CT NECK SPINE W/O DYE: CPT

## 2024-06-30 PROCEDURE — 96374 THER/PROPH/DIAG INJ IV PUSH: CPT

## 2024-06-30 RX ORDER — DOXYCYCLINE 100 MG/1
100 CAPSULE ORAL ONCE
Status: COMPLETED | OUTPATIENT
Start: 2024-06-30 | End: 2024-06-30

## 2024-06-30 RX ORDER — METHYLPREDNISOLONE 4 MG/1
TABLET ORAL
Qty: 21 TABLET | Refills: 0 | Status: SHIPPED | OUTPATIENT
Start: 2024-06-30

## 2024-06-30 RX ORDER — DOXYCYCLINE 100 MG/1
100 CAPSULE ORAL 2 TIMES DAILY
Qty: 14 CAPSULE | Refills: 0 | Status: SHIPPED | OUTPATIENT
Start: 2024-06-30

## 2024-06-30 RX ORDER — ASPIRIN 81 MG/1
324 TABLET, CHEWABLE ORAL ONCE
Status: COMPLETED | OUTPATIENT
Start: 2024-06-30 | End: 2024-06-30

## 2024-06-30 RX ORDER — DEXAMETHASONE SODIUM PHOSPHATE 10 MG/ML
10 INJECTION, SOLUTION INTRAMUSCULAR; INTRAVENOUS ONCE
Status: COMPLETED | OUTPATIENT
Start: 2024-06-30 | End: 2024-06-30

## 2024-06-30 RX ORDER — SODIUM CHLORIDE 0.9 % (FLUSH) 0.9 %
10 SYRINGE (ML) INJECTION AS NEEDED
Status: DISCONTINUED | OUTPATIENT
Start: 2024-06-30 | End: 2024-06-30 | Stop reason: HOSPADM

## 2024-06-30 RX ADMIN — DEXAMETHASONE SODIUM PHOSPHATE 10 MG: 10 INJECTION INTRAMUSCULAR; INTRAVENOUS at 15:29

## 2024-06-30 RX ADMIN — DOXYCYCLINE 100 MG: 100 CAPSULE ORAL at 15:29

## 2024-06-30 RX ADMIN — ASPIRIN 324 MG: 81 TABLET, CHEWABLE ORAL at 13:19

## 2024-06-30 NOTE — ED PROVIDER NOTES
Subjective   History of Present Illness  Patient is a 48-year-old female with past medical history of hypertension, however states that she does not take her medication.  Patient presents today with complaints of left upper chest pain, this is reproducible on palpation.  Patient also reports that she has had tingling into bilateral arms with neck pain and upper back pain.  Patient denies any unilateral numbness/weakness, nausea, vomiting, abdominal pain, dysuria, shortness of breath.  Patient reports that she does however have pain on inspiration on that left side.  Patient denies any dizziness or lightheadedness.  Patient denies any known fevers at home.  Patient is afebrile here today.  Patient is alert and oriented and able to answer all questions appropriately.  Patient presents private vehicle with her son at bedside.        Review of Systems   Constitutional: Negative.  Negative for fever.   HENT: Negative.     Respiratory:  Positive for chest tightness.    Cardiovascular: Negative.  Negative for chest pain.   Gastrointestinal: Negative.  Negative for abdominal pain.   Endocrine: Negative.    Genitourinary: Negative.  Negative for dysuria.   Musculoskeletal:  Positive for neck pain.   Skin: Negative.    Neurological: Negative.    Psychiatric/Behavioral: Negative.     All other systems reviewed and are negative.      Past Medical History:   Diagnosis Date    Kidney stones        Allergies   Allergen Reactions    Naproxen Rash    Penicillins Rash       Past Surgical History:   Procedure Laterality Date     SECTION      COLONOSCOPY N/A 2022    Procedure: COLONOSCOPY;  Surgeon: Neda Salgado MD;  Location: [facility];  Service: Gastroenterology;  Laterality: N/A;    TUBAL ABDOMINAL LIGATION         Family History   Problem Relation Age of Onset    Breast cancer Paternal Aunt     Heart attack Father     Hypertension Mother        Social History     Socioeconomic History    Marital status:     Tobacco Use    Smoking status: Never    Smokeless tobacco: Never   Vaping Use    Vaping status: Never Used   Substance and Sexual Activity    Alcohol use: No    Drug use: No    Sexual activity: Yes     Partners: Male     Birth control/protection: Tubal ligation           Objective   Physical Exam  Vitals and nursing note reviewed.   Constitutional:       General: She is not in acute distress.     Appearance: She is well-developed. She is not diaphoretic.   HENT:      Head: Normocephalic and atraumatic.      Right Ear: External ear normal.      Left Ear: External ear normal.      Nose: Nose normal.   Eyes:      Conjunctiva/sclera: Conjunctivae normal.      Pupils: Pupils are equal, round, and reactive to light.   Neck:      Vascular: No JVD.      Trachea: No tracheal deviation.   Cardiovascular:      Rate and Rhythm: Normal rate and regular rhythm.      Heart sounds: Normal heart sounds. No murmur heard.  Pulmonary:      Effort: Pulmonary effort is normal. No respiratory distress.      Breath sounds: Normal breath sounds. No wheezing.      Comments: Reproducible chest soreness to the left upper chest wall.  Abdominal:      General: Bowel sounds are normal.      Palpations: Abdomen is soft.      Tenderness: There is no abdominal tenderness.   Musculoskeletal:         General: No deformity. Normal range of motion.      Cervical back: Normal range of motion and neck supple.   Skin:     General: Skin is warm and dry.      Coloration: Skin is not pale.      Findings: No erythema or rash.   Neurological:      Mental Status: She is alert and oriented to person, place, and time.      Cranial Nerves: No cranial nerve deficit.   Psychiatric:         Behavior: Behavior normal.         Thought Content: Thought content normal.       Results for orders placed or performed during the hospital encounter of 06/30/24   COVID-19 and FLU A/B PCR, 1 HR TAT - Swab, Nasopharynx    Specimen: Nasopharynx; Swab   Result Value Ref  Range    COVID19 Not Detected Not Detected - Ref. Range    Influenza A PCR Not Detected Not Detected    Influenza B PCR Not Detected Not Detected   Comprehensive Metabolic Panel    Specimen: Blood   Result Value Ref Range    Glucose 122 (H) 65 - 99 mg/dL    BUN 12 6 - 20 mg/dL    Creatinine 0.94 0.57 - 1.00 mg/dL    Sodium 141 136 - 145 mmol/L    Potassium 3.8 3.5 - 5.2 mmol/L    Chloride 105 98 - 107 mmol/L    CO2 25.5 22.0 - 29.0 mmol/L    Calcium 9.0 8.6 - 10.5 mg/dL    Total Protein 7.5 6.0 - 8.5 g/dL    Albumin 3.9 3.5 - 5.2 g/dL    ALT (SGPT) 16 1 - 33 U/L    AST (SGOT) 15 1 - 32 U/L    Alkaline Phosphatase 81 39 - 117 U/L    Total Bilirubin 0.2 0.0 - 1.2 mg/dL    Globulin 3.6 gm/dL    A/G Ratio 1.1 g/dL    BUN/Creatinine Ratio 12.8 7.0 - 25.0    Anion Gap 10.5 5.0 - 15.0 mmol/L    eGFR 75.5 >60.0 mL/min/1.73   High Sensitivity Troponin T    Specimen: Blood   Result Value Ref Range    HS Troponin T 12 <14 ng/L   CBC Auto Differential    Specimen: Blood   Result Value Ref Range    WBC 9.54 3.40 - 10.80 10*3/mm3    RBC 4.83 3.77 - 5.28 10*6/mm3    Hemoglobin 12.9 12.0 - 15.9 g/dL    Hematocrit 40.4 34.0 - 46.6 %    MCV 83.6 79.0 - 97.0 fL    MCH 26.7 26.6 - 33.0 pg    MCHC 31.9 31.5 - 35.7 g/dL    RDW 13.6 12.3 - 15.4 %    RDW-SD 41.4 37.0 - 54.0 fl    MPV 10.6 6.0 - 12.0 fL    Platelets 340 140 - 450 10*3/mm3    Neutrophil % 58.0 42.7 - 76.0 %    Lymphocyte % 29.9 19.6 - 45.3 %    Monocyte % 6.6 5.0 - 12.0 %    Eosinophil % 4.3 0.3 - 6.2 %    Basophil % 0.9 0.0 - 1.5 %    Immature Grans % 0.3 0.0 - 0.5 %    Neutrophils, Absolute 5.53 1.70 - 7.00 10*3/mm3    Lymphocytes, Absolute 2.85 0.70 - 3.10 10*3/mm3    Monocytes, Absolute 0.63 0.10 - 0.90 10*3/mm3    Eosinophils, Absolute 0.41 (H) 0.00 - 0.40 10*3/mm3    Basophils, Absolute 0.09 0.00 - 0.20 10*3/mm3    Immature Grans, Absolute 0.03 0.00 - 0.05 10*3/mm3    nRBC 0.0 0.0 - 0.2 /100 WBC   TSH    Specimen: Blood   Result Value Ref Range    TSH 1.390 0.270 -  4.200 uIU/mL   T4, Free    Specimen: Blood   Result Value Ref Range    Free T4 1.01 0.93 - 1.70 ng/dL   ECG 12 Lead ED Triage Standing Order; Chest Pain   Result Value Ref Range    QT Interval 384 ms    QTC Interval 446 ms   Green Top (Gel)   Result Value Ref Range    Extra Tube Hold for add-ons.    Lavender Top   Result Value Ref Range    Extra Tube hold for add-on    Gold Top - SST   Result Value Ref Range    Extra Tube Hold for add-ons.    Light Blue Top   Result Value Ref Range    Extra Tube Hold for add-ons.      XR Chest 1 View    Result Date: 6/30/2024  Findings concerning for left upper lobe pneumonia.   This report was finalized on 6/30/2024 1:51 PM by Alex Pallas, DO.      CT Cervical Spine Without Contrast    Result Date: 6/30/2024  No acute intracranial process. Negative for cervical spine fracture.   This report was finalized on 6/30/2024 2:42 PM by Alex Pallas, DO.      CT Head Without Contrast    Result Date: 6/30/2024  No acute intracranial process. Negative for cervical spine fracture.   This report was finalized on 6/30/2024 2:42 PM by Alex Pallas, DO.         Procedures           ED Course  ED Course as of 06/30/24 1505   Sun Jun 30, 2024   1447 ECG 12 Lead ED Triage Standing Order; Chest Pain  Normal sinus rhythm.  Rate 81.  Normal axis.  Normal QT interval.  Q wave in 1, aVL, V2.  No ST elevation or depression.  Abnormal EKG.  Interpreted by me.  Electronically signed by Colby Peralta MD, 06/30/24, 2:48 PM EDT.   [BC]      ED Course User Index  [BC] Colby Peralta MD                                             Medical Decision Making  Patient is a 48-year-old female with past medical history of hypertension, however states that she does not take her medication.  Patient presents today with complaints of left upper chest pain, this is reproducible on palpation.  Patient also reports that she has had tingling into bilateral arms with neck pain and upper back pain.  Patient denies any unilateral  numbness/weakness, nausea, vomiting, abdominal pain, dysuria, shortness of breath.  Patient reports that she does however have pain on inspiration on that left side.  Patient denies any dizziness or lightheadedness.  Patient denies any known fevers at home.  Patient is afebrile here today.  Patient is alert and oriented and able to answer all questions appropriately.  Patient presents private vehicle with her son at bedside.    Problems Addressed:  Pneumonia of left upper lobe due to infectious organism: complicated acute illness or injury    Amount and/or Complexity of Data Reviewed  Labs: ordered.  Radiology: ordered.  ECG/medicine tests: ordered. Decision-making details documented in ED Course.    Risk  OTC drugs.  Prescription drug management.        Final diagnoses:   Pneumonia of left upper lobe due to infectious organism       ED Disposition  ED Disposition       ED Disposition   Discharge    Condition   Stable    Comment   --               Soheila Mercado, APRN  [address] [facility] [address] [phone]    In 2 days  If symptoms worsen         Medication List        New Prescriptions      doxycycline 100 MG capsule  Commonly known as: MONODOX  Take 1 capsule by mouth 2 (Two) Times a Day.     methylPREDNISolone 4 MG dose pack  Commonly known as: MEDROL  Take as directed on package instructions.               Where to Get Your Medications        These medications were sent to [facility] [place], [place] - [address] [facility] [address] - [phone] [place] [phone]   [address] [facility] [place] [address]      Phone: [phone]   doxycycline 100 MG capsule  methylPREDNISolone 4 MG dose pack            Dayna Lopez, APRN  06/30/24 1502

## 2024-07-10 ENCOUNTER — TRANSCRIBE ORDERS (OUTPATIENT)
Dept: ADMINISTRATIVE | Facility: HOSPITAL | Age: 49
End: 2024-07-10
Payer: COMMERCIAL

## 2024-07-10 DIAGNOSIS — R01.1 HEART MURMUR: Primary | ICD-10-CM

## 2024-08-27 ENCOUNTER — HOSPITAL ENCOUNTER (OUTPATIENT)
Facility: HOSPITAL | Age: 49
Discharge: HOME | End: 2024-08-27
Admitting: NURSE PRACTITIONER
Payer: COMMERCIAL

## 2024-08-27 DIAGNOSIS — R01.1 HEART MURMUR: ICD-10-CM

## 2024-08-27 LAB
BH CV ECHO MEAS - ACS: 1.5 CM
BH CV ECHO MEAS - AO MAX PG: 8.9 MMHG
BH CV ECHO MEAS - AO MEAN PG: 5.3 MMHG
BH CV ECHO MEAS - AO ROOT DIAM: 2.9 CM
BH CV ECHO MEAS - AO V2 MAX: 149 CM/SEC
BH CV ECHO MEAS - AO V2 VTI: 25.9 CM
BH CV ECHO MEAS - EDV(MOD-SP4): 90.8 ML
BH CV ECHO MEAS - EF(MOD-SP4): 74.3 %
BH CV ECHO MEAS - EPSS: 0.12 CM
BH CV ECHO MEAS - ESV(MOD-SP4): 23.3 ML
BH CV ECHO MEAS - IVS/LVPW: 1.08 CM
BH CV ECHO MEAS - IVSD: 1.43 CM
BH CV ECHO MEAS - LA DIMENSION: 4.4 CM
BH CV ECHO MEAS - LAT PEAK E' VEL: 9.9 CM/SEC
BH CV ECHO MEAS - LV DIASTOLIC VOL/BSA (35-75): 50.3 CM2
BH CV ECHO MEAS - LV MAX PG: 6.1 MMHG
BH CV ECHO MEAS - LV MEAN PG: 3.8 MMHG
BH CV ECHO MEAS - LV SYSTOLIC VOL/BSA (12-30): 12.9 CM2
BH CV ECHO MEAS - LV V1 MAX: 123 CM/SEC
BH CV ECHO MEAS - LV V1 VTI: 28 CM
BH CV ECHO MEAS - LVIDD: 3.9 CM
BH CV ECHO MEAS - LVIDS: 1.78 CM
BH CV ECHO MEAS - LVOT DIAM: 1.91 CM
BH CV ECHO MEAS - LVPWD: 1.33 CM
BH CV ECHO MEAS - MED PEAK E' VEL: 8.3 CM/SEC
BH CV ECHO MEAS - PA ACC TIME: 0.16 SEC
BH CV ECHO MEAS - RAP SYSTOLE: 10 MMHG
BH CV ECHO MEAS - RVSP: 12.4 MMHG
BH CV ECHO MEAS - SV(MOD-SP4): 67.5 ML
BH CV ECHO MEAS - SVI(MOD-SP4): 37.4 ML/M2
BH CV ECHO MEAS - TAPSE (>1.6): 3.4 CM
BH CV ECHO MEAS - TR MAX PG: 2.35 MMHG
BH CV ECHO MEAS - TR MAX VEL: 74.7 CM/SEC

## 2024-08-27 PROCEDURE — 93306 TTE W/DOPPLER COMPLETE: CPT

## 2024-09-17 ENCOUNTER — OFFICE VISIT (OUTPATIENT)
Age: 49
End: 2024-09-17
Payer: COMMERCIAL

## 2024-09-17 VITALS
BODY MASS INDEX: 37 KG/M2 | SYSTOLIC BLOOD PRESSURE: 148 MMHG | HEIGHT: 61 IN | OXYGEN SATURATION: 98 % | WEIGHT: 196 LBS | HEART RATE: 70 BPM | DIASTOLIC BLOOD PRESSURE: 82 MMHG

## 2024-09-17 DIAGNOSIS — E78.5 DYSLIPIDEMIA: ICD-10-CM

## 2024-09-17 DIAGNOSIS — R06.09 DYSPNEA ON EXERTION: ICD-10-CM

## 2024-09-17 DIAGNOSIS — R07.2 PRECORDIAL PAIN: Primary | ICD-10-CM

## 2024-09-17 DIAGNOSIS — Z82.49 FAMILY HISTORY OF PREMATURE CORONARY ARTERY DISEASE: ICD-10-CM

## 2024-09-17 PROCEDURE — 99204 OFFICE O/P NEW MOD 45 MIN: CPT | Performed by: INTERNAL MEDICINE

## 2024-09-17 PROCEDURE — 93000 ELECTROCARDIOGRAM COMPLETE: CPT | Performed by: INTERNAL MEDICINE

## 2024-09-17 RX ORDER — ASPIRIN 81 MG/1
81 TABLET ORAL DAILY
Qty: 30 TABLET | Refills: 1 | Status: SHIPPED | OUTPATIENT
Start: 2024-09-17

## 2024-09-17 RX ORDER — METOPROLOL SUCCINATE 25 MG/1
25 TABLET, EXTENDED RELEASE ORAL DAILY
Qty: 30 TABLET | Refills: 2 | Status: SHIPPED | OUTPATIENT
Start: 2024-09-17

## 2024-10-07 ENCOUNTER — TELEPHONE (OUTPATIENT)
Dept: CARDIOLOGY | Facility: CLINIC | Age: 49
End: 2024-10-07
Payer: COMMERCIAL

## 2024-10-07 NOTE — TELEPHONE ENCOUNTER
Hub to relay.     Called pt to advise her that I have moved her apt til 11/5 at 9 am. No answer LM.     She will also need to get her labs done and will need to fast prior to her labs.

## 2024-10-08 NOTE — TELEPHONE ENCOUNTER
Name:       Relationship:       Best Callback Number: [phone]      HUB PROVIDED THE RELAY MESSAGE FROM THE OFFICE      PATIENT: VOICED UNDERSTANDING AND HAS NO FURTHER QUESTIONS AT THIS TIME    ADDITIONAL INFORMATION:

## 2024-10-22 ENCOUNTER — TELEPHONE (OUTPATIENT)
Dept: CARDIOLOGY | Facility: CLINIC | Age: 49
End: 2024-10-22
Payer: COMMERCIAL

## 2024-10-22 NOTE — TELEPHONE ENCOUNTER
Caller: Nohemi Ospina    Relationship: Self    Best call back number: [phone]     What is the best time to reach you: ANY     Who are you requesting to speak with (clinical staff, provider,  specific staff member): CLINICAL     What was the call regarding: PT WAS HAVING STRESS TODAY @ [facility], BUT WENT TO THE HOS AND WAS TOLD THIS WAS CAN. PT IS WONDERING WHY. PLEASE ADVISE.     Is it okay if the provider responds through MyChart: CALL

## 2025-01-29 ENCOUNTER — HOSPITAL ENCOUNTER (OUTPATIENT)
Dept: GENERAL RADIOLOGY | Facility: HOSPITAL | Age: 50
Discharge: HOME | End: 2025-01-29
Payer: COMMERCIAL

## 2025-01-29 DIAGNOSIS — R10.9 RIGHT FLANK PAIN: ICD-10-CM

## 2025-01-29 PROCEDURE — 74018 RADEX ABDOMEN 1 VIEW: CPT | Performed by: RADIOLOGY

## 2025-01-29 PROCEDURE — 74018 RADEX ABDOMEN 1 VIEW: CPT

## 2025-01-30 ENCOUNTER — OFFICE VISIT (OUTPATIENT)
Dept: UROLOGY | Facility: CLINIC | Age: 50
End: 2025-01-30
Payer: COMMERCIAL

## 2025-01-30 VITALS
WEIGHT: 200 LBS | BODY MASS INDEX: 37.76 KG/M2 | HEIGHT: 61 IN | SYSTOLIC BLOOD PRESSURE: 146 MMHG | HEART RATE: 104 BPM | DIASTOLIC BLOOD PRESSURE: 81 MMHG

## 2025-01-30 DIAGNOSIS — R10.9 RIGHT FLANK PAIN: Primary | ICD-10-CM

## 2025-01-30 PROCEDURE — 80048 BASIC METABOLIC PNL TOTAL CA: CPT

## 2025-01-30 RX ORDER — IBUPROFEN 800 MG/1
800 TABLET, FILM COATED ORAL EVERY 8 HOURS PRN
Qty: 60 TABLET | Refills: 1 | Status: SHIPPED | OUTPATIENT
Start: 2025-01-30

## 2025-01-30 NOTE — PROGRESS NOTES
"Chief Complaint:    Chief Complaint   Patient presents with    Nephrolithiasis       Vital Signs:   /81 (BP Location: Right arm, Patient Position: Sitting)   Pulse 104   Ht 154.9 cm (60.98\")   Wt 90.7 kg (200 lb)   BMI 37.81 kg/m²   Body mass index is 37.81 kg/m².      HPI:  Nohemi Ospina is a 49 y.o. female who presents today for follow up    History of Present Illness  Ms. Ospina presents to the clinic for follow-up for nephrolithiasis and concerns of kidney stone.  Patient was last seen in office by me in February 2024 and had x-ray completed at that time that was unremarkable other than a moderate stool burden.  She reports that she has had right-sided back and flank pain over the past week.  She did initially go to her primary care provider on 1/24/2025 and was prescribed baclofen, methylprednisone, and Macrobid twice daily for concerns of UTI.  Her urine analysis did have 3+ blood and trace leukocytes.  Urine culture came back with mixed salma only.  She did have an x-ray completed yesterday that showed a large stool burden with no obvious calcifications.  She reports that ice has helped with back pain.  She does endorse some intermittent pelvic pain with frequency/urgency.  She was unable to urinate in office today.      Past Medical History:  Past Medical History:   Diagnosis Date    Hypertension     Kidney stones        Current Meds:  Current Outpatient Medications   Medication Sig Dispense Refill    aspirin 81 MG EC tablet Take 1 tablet by mouth Daily. (Patient not taking: Reported on 1/30/2025) 30 tablet 1    ibuprofen (ADVIL,MOTRIN) 800 MG tablet Take 1 tablet by mouth Every 8 (Eight) Hours As Needed for Mild Pain or Moderate Pain. 60 tablet 1    metoprolol succinate XL (TOPROL-XL) 25 MG 24 hr tablet Take 1 tablet by mouth Daily. (Patient not taking: Reported on 1/30/2025) 30 tablet 2     No current facility-administered medications for this visit.        Allergies:   Allergies   Allergen " Reactions    Naproxen Rash    Penicillins Rash        Past Surgical History:  Past Surgical History:   Procedure Laterality Date     SECTION      COLONOSCOPY N/A 2022    Procedure: COLONOSCOPY;  Surgeon: Neda Salgado MD;  Location: [facility];  Service: Gastroenterology;  Laterality: N/A;    TUBAL ABDOMINAL LIGATION         Social History:  Social History     Socioeconomic History    Marital status:    Tobacco Use    Smoking status: Never    Smokeless tobacco: Never   Vaping Use    Vaping status: Never Used   Substance and Sexual Activity    Alcohol use: No    Drug use: No    Sexual activity: Yes     Partners: Male     Birth control/protection: Tubal ligation       Family History:  Family History   Problem Relation Age of Onset    Breast cancer Paternal Aunt     Heart attack Father     Hypertension Mother        Review of Systems:  Review of Systems   Constitutional:  Negative for chills, fatigue and fever.   Respiratory:  Negative for cough, shortness of breath and wheezing.    Cardiovascular:  Negative for leg swelling.   Gastrointestinal:  Negative for abdominal pain, nausea and vomiting.   Genitourinary:  Positive for flank pain, frequency, pelvic pain and urgency. Negative for difficulty urinating.   Musculoskeletal:  Positive for back pain. Negative for joint swelling.   Neurological:  Negative for dizziness and headaches.   Psychiatric/Behavioral:  Negative for confusion.        Physical Exam:  Physical Exam  Constitutional:       General: She is not in acute distress.     Appearance: Normal appearance.   HENT:      Head: Normocephalic and atraumatic.      Nose: Nose normal.      Mouth/Throat:      Mouth: Mucous membranes are moist.   Eyes:      Conjunctiva/sclera: Conjunctivae normal.   Cardiovascular:      Rate and Rhythm: Normal rate and regular rhythm.      Pulses: Normal pulses.      Heart sounds: Normal heart sounds.   Pulmonary:      Effort: Pulmonary effort is normal.       Breath sounds: Normal breath sounds.   Abdominal:      General: Bowel sounds are normal.      Palpations: Abdomen is soft.      Tenderness: There is no left CVA tenderness.      Comments: Tenderness to palpation just lateral to the right T12 vertebrae.  No erythema or bruising noted.   Musculoskeletal:         General: Normal range of motion.      Cervical back: Normal range of motion.   Skin:     General: Skin is warm.   Neurological:      General: No focal deficit present.      Mental Status: She is alert and oriented to person, place, and time.   Psychiatric:         Mood and Affect: Mood normal.         Behavior: Behavior normal.         Thought Content: Thought content normal.         Judgment: Judgment normal.              Recent Image (CT and/or KUB):   CT Abdomen and Pelvis: Results for orders placed during the hospital encounter of 12/13/18    CT Abdomen Pelvis With & Without Contrast    Narrative  CT ABDOMEN PELVIS W WO CONTRAST-    CLINICAL INDICATION: Hematuria; R31.29-Other microscopic hematuria      COMPARISON: 07/26/2016    TECHNIQUE: Axial images were acquired from the lung bases through the  pubic symphysis without any IV then after IV contrast.  Reformatted images were created in both the coronal and sagittal planes.    Radiation dose reduction techniques were utilized per ALARA protocol.  Automated exposure control was initiated through either or CareDose or  DoseRigTelx software packages by  protocol.      FINDINGS:  The lung bases are clear. There are no pleural effusions.    The liver is homogeneous. There is no evidence of focal hepatic mass    The spleen is homogeneous    There is no peripancreatic stranding or pancreatic head mass.    There is no adrenal enlargement.    The kidneys show no evidence of hydronephrosis or hydroureter. I do not  see any distal ureteral stones.    Otherwise I do not see any free fluid or walled off fluid collections.    There is no bowel wall  thickening or mesenteric stranding.    There is no evidence of mesenteric or retroperitoneal adenopathy    Bilateral tubal ligation clips are present.    Impression  : No definitive source for the patient's symptoms.          This report was finalized on 12/13/2018 2:00 PM by Dr. Alfredito Hall MD.     CT Stone Protocol: Results for orders placed during the hospital encounter of 03/11/22    CT Abdomen Pelvis Stone Protocol    Narrative  CT ABDOMEN AND PELVIS, NONCONTRAST, 3/11/2022    HISTORY:  46-year-old female in the ED complaining of abdomen pain and nausea for about 3 days. Hematuria is noted.    TECHNIQUE:  CT imaging of the abdomen and pelvis ordered without IV contrast. Radiation dose reduction techniques included automated exposure control. Radiation audit for CT and nuclear cardiology exams in the last 12 months: 0.    ABDOMEN FINDINGS:  Examination shows severe perforated diverticulitis involving the low descending colon within the left upper pelvis laterally. Findings include marked short segment colon wall thickening, several enlarged and inflamed colonic diverticula and extensive  pericolonic inflammatory soft tissue stranding. There is a small volume of contained extraluminal air along the mesenteric border of the inflamed colon segment, but no significant fluid collection or drainable abscess is visible at this time. There is no  bowel dilatation to suggest obstruction.    Mild to moderate sigmoid colonic diverticulosis. Normal appendix. No gastric distention or distal esophageal dilatation.    Kidneys, ureters and urinary bladder are normal in noncontrast CT appearance. No visible nephrolithiasis or evidence of urinary obstruction.    No gallbladder distention or bile duct dilatation. Liver, pancreas and spleen are within normal limits. Normal caliber abdominal aorta. Small fat-containing umbilical hernia.    PELVIS FINDINGS:  Uterus, ovaries, urinary bladder and rectum are within normal limits. No  free pelvic fluid. No inguinal hernia.    Lung base images show no active disease.    Impression  1.  Severe segmental acute diverticulitis involving the low descending colon with evidence of localized perforation as detailed above. No drainable fluid collection is present at this time.  2.  Sigmoid and descending colonic diverticulosis. Normal appendix. No colon dilatation.    Signer Name: Leonardo Alcantara MD  Signed: 3/11/2022 7:44 PM  Workstation Name: [facility]  [facility]     KUB: Results for orders placed during the hospital encounter of 01/29/25    XR Abdomen KUB    Narrative  EXAMINATION: XR ABDOMEN KUB-    CLINICAL INDICATION: kidney stone; R10.9-Unspecified abdominal pain      COMPARISON: 2/12/2024    FINDINGS:  2 views of the abdomen    Large stool burden    No evidence of bowel obstruction    Impression  Large stool burden.      This report was finalized on 1/29/2025 12:55 PM by Dr. Alfredito Hall MD.       Labs:  Brief Urine Lab Results  (Last result in the past 365 days)        Color   Clarity   Blood   Leuk Est   Nitrite   Protein   CREAT   Urine HCG        02/12/24 0959 Yellow   Clear   Large   Negative   Negative   2+                 No visits with results within 3 Month(s) from this visit.   Latest known visit with results is:   Hospital Outpatient Visit on 08/27/2024   Component Date Value Ref Range Status    LVIDd 08/27/2024 3.9  cm Final    LVIDs 08/27/2024 1.78  cm Final    IVSd 08/27/2024 1.43  cm Final    LVPWd 08/27/2024 1.33  cm Final    IVS/LVPW 08/27/2024 1.08  cm Final    LV Sys Vol (BSA corrected) 08/27/2024 12.9  cm2 Final    LV Lux Vol (BSA corrected) 08/27/2024 50.3  cm2 Final    LVOT diam 08/27/2024 1.91  cm Final    EDV(MOD-sp4) 08/27/2024 90.8  ml Final    ESV(MOD-sp4) 08/27/2024 23.3  ml Final    SV(MOD-sp4) 08/27/2024 67.5  ml Final    SVi(MOD-SP4) 08/27/2024 37.4  ml/m2 Final    EF(MOD-sp4) 08/27/2024 74.3  % Final    Med Peak E' Josiah 08/27/2024  8.3  cm/sec Final    Lat Peak E' Josiah 08/27/2024 9.9  cm/sec Final    TR max josiah 08/27/2024 74.7  cm/sec Final    TAPSE (>1.6) 08/27/2024 3.4  cm Final    LA dimension (2D)  08/27/2024 4.4  cm Final    LV V1 max 08/27/2024 123.0  cm/sec Final    LV V1 max PG 08/27/2024 6.1  mmHg Final    LV V1 mean PG 08/27/2024 3.8  mmHg Final    LV V1 VTI 08/27/2024 28.0  cm Final    Ao pk josiah 08/27/2024 149.0  cm/sec Final    Ao max PG 08/27/2024 8.9  mmHg Final    Ao mean PG 08/27/2024 5.3  mmHg Final    Ao V2 VTI 08/27/2024 25.9  cm Final    EPSS 08/27/2024 0.12  cm Final    TR max PG 08/27/2024 2.35  mmHg Final    RVSP(TR) 08/27/2024 12.4  mmHg Final    RAP systole 08/27/2024 10.0  mmHg Final    PA acc time 08/27/2024 0.16  sec Final    Ao root diam 08/27/2024 2.9  cm Final    ACS 08/27/2024 1.50  cm Final        Procedure: None  Procedures     I have reviewed and agree with the above PMH, PSH, FMH, social history, medications, allergies, and labs.     Assessment/Plan:   Problem List Items Addressed This Visit    None  Visit Diagnoses       Right flank pain    -  Primary    Relevant Medications    ibuprofen (ADVIL,MOTRIN) 800 MG tablet    Other Relevant Orders    XR abdomen kub (Completed)    Basic Metabolic Panel            Health Maintenance:   Health Maintenance Due   Topic Date Due    TDAP/TD VACCINES (1 - Tdap) Never done    MAMMOGRAM  07/26/2018    HEPATITIS C SCREENING  Never done    ANNUAL PHYSICAL  Never done    BMI FOLLOWUP  04/05/2023    INFLUENZA VACCINE  Never done    PAP SMEAR  07/13/2024    COVID-19 Vaccine (2 - 2024-25 season) 09/01/2024        Smoking Counseling: Never smoked 3 smokeless tobacco    Urine Incontinence: Patient reports that she is not currently experiencing any symptoms of urinary incontinence.    Patient was given instructions and counseling regarding her condition or for health maintenance advice. Please see specific information pulled into the AVS if appropriate.    Patient Education:   Right  flank pain -discussed with the patient the pathophysiology of this condition in detail.  Given negative x-ray and negative urine culture I suspect this is most likely musculoskeletal in nature.  I am recommending start ibuprofen 800 mg once every 8 hours as needed for mild to moderate pain.  Patient has tolerated this medication previously.  Did advise her to discontinue if she begins to experience worsening back or flank pain, hives, shortness of breath, or rash.  Did advise her to start Flomax 0.4 mg once daily with possible passage of small kidney stone.  I will check a BMP in office today and call with results once available.  Did advise her if kidney function is decreased we will proceed forward with a stat CT scan of the abdomen pelvis.  Otherwise we will see her back pending this per her request.    Visit Diagnoses:    ICD-10-CM ICD-9-CM   1. Right flank pain  R10.9 789.09     A total of 25 minutes were spent coordinating this patient’s care in clinic today; 15 minutes of which were face-to-face with the patient, reviewing medical history and counseling on the current treatment and followup plan.  All questions were answered to patient's satisfaction.    Meds Ordered During Visit:  New Medications Ordered This Visit   Medications    ibuprofen (ADVIL,MOTRIN) 800 MG tablet     Sig: Take 1 tablet by mouth Every 8 (Eight) Hours As Needed for Mild Pain or Moderate Pain.     Dispense:  60 tablet     Refill:  1       Follow Up Appointment: As needed per patient's request  No follow-ups on file.      This document has been electronically signed by Felix Merino PA-C   January 30, 2025 15:55 EST    Part of this note may be an electronic transcription/translation of spoken language to printed text using the Dragon Dictation System.

## 2025-01-31 ENCOUNTER — TELEPHONE (OUTPATIENT)
Dept: UROLOGY | Facility: CLINIC | Age: 50
End: 2025-01-31
Payer: COMMERCIAL

## 2025-01-31 LAB
ANION GAP SERPL CALCULATED.3IONS-SCNC: 11 MMOL/L (ref 5–15)
BUN SERPL-MCNC: 17 MG/DL (ref 6–20)
BUN/CREAT SERPL: 16.8 (ref 7–25)
CALCIUM SPEC-SCNC: 9.5 MG/DL (ref 8.6–10.5)
CHLORIDE SERPL-SCNC: 99 MMOL/L (ref 98–107)
CO2 SERPL-SCNC: 27 MMOL/L (ref 22–29)
CREAT SERPL-MCNC: 1.01 MG/DL (ref 0.57–1)
EGFRCR SERPLBLD CKD-EPI 2021: 68.8 ML/MIN/1.73
GLUCOSE SERPL-MCNC: 93 MG/DL (ref 65–99)
POTASSIUM SERPL-SCNC: 3.7 MMOL/L (ref 3.5–5.2)
SODIUM SERPL-SCNC: 137 MMOL/L (ref 136–145)

## 2025-01-31 NOTE — TELEPHONE ENCOUNTER
Called patient back and advised her creatinine had slightly increased to 1.1 and GFR was 68.8.  I recommended to increase water intake to 2 L/day and advised her if symptoms persist over the weekend we can proceed forward with a stat CT on Monday.  Educated to go to the nearest ER if she has any other worsening symptoms or concerns.  Otherwise she will call us back on Monday.

## 2025-01-31 NOTE — TELEPHONE ENCOUNTER
Patient returned your call to go over labs and would like a call back if you get time. She can be reached at [phone].

## 2025-01-31 NOTE — TELEPHONE ENCOUNTER
Tempted to call patient about BMP results however she did not answer.  Left voicemail advising to call back

## (undated) DEVICE — Device: Brand: DEFENDO AIR/WATER/SUCTION AND BIOPSY VALVE

## (undated) DEVICE — SUCTION CANISTER, 1500CC, RIGID: Brand: DEROYAL

## (undated) DEVICE — SYR LUERLOK 30CC

## (undated) DEVICE — TUBING, SUCTION, 1/4" X 20', STRAIGHT: Brand: MEDLINE INDUSTRIES, INC.

## (undated) DEVICE — CONN Y IRR DISP 1P/U

## (undated) DEVICE — SINGLE PORT MANIFOLD: Brand: NEPTUNE 2

## (undated) DEVICE — ENDOGATOR TUBING FOR ENDOGATOR EGP-100 IRRIGATION PUMP,OLYMPUS OFP PUMP, OLYMPUS AFU-100 PUMP AND ERBE EIP2 PUMP: Brand: ENDOGATOR

## (undated) DEVICE — GOWN,REINF,POLY,ECL,PP SLV,XL: Brand: MEDLINE

## (undated) DEVICE — ENDOGATOR AUXILIARY WATER JET CONNECTOR: Brand: ENDOGATOR

## (undated) DEVICE — Device